# Patient Record
Sex: FEMALE | Race: WHITE | Employment: UNEMPLOYED | URBAN - METROPOLITAN AREA
[De-identification: names, ages, dates, MRNs, and addresses within clinical notes are randomized per-mention and may not be internally consistent; named-entity substitution may affect disease eponyms.]

---

## 2017-09-04 ENCOUNTER — HOSPITAL ENCOUNTER (EMERGENCY)
Age: 62
Discharge: HOME | End: 2017-09-04
Attending: EMERGENCY MEDICINE
Payer: MEDICAID

## 2017-09-04 VITALS
DIASTOLIC BLOOD PRESSURE: 76 MMHG | HEART RATE: 64 BPM | TEMPERATURE: 98 F | SYSTOLIC BLOOD PRESSURE: 136 MMHG | RESPIRATION RATE: 14 BRPM | OXYGEN SATURATION: 100 %

## 2017-09-04 DIAGNOSIS — R30.0 DYSURIA: Primary | ICD-10-CM

## 2017-09-04 LAB
APPEARANCE UR: CLEAR
BACTERIA URNS QL MICRO: ABNORMAL /HPF
BILIRUB UR QL: NEGATIVE
COLOR UR: YELLOW
EPITH CASTS URNS QL MICRO: ABNORMAL /LPF (ref 0–5)
GLUCOSE UR STRIP.AUTO-MCNC: NEGATIVE MG/DL
HGB UR QL STRIP: NEGATIVE
KETONES UR QL STRIP.AUTO: NEGATIVE MG/DL
LEUKOCYTE ESTERASE UR QL STRIP.AUTO: ABNORMAL
NITRITE UR QL STRIP.AUTO: NEGATIVE
PH UR STRIP: 7.5 [PH] (ref 5–8)
PROT UR STRIP-MCNC: NEGATIVE MG/DL
RBC #/AREA URNS HPF: ABNORMAL /HPF (ref 0–5)
SP GR UR REFRACTOMETRY: 1.02 (ref 1–1.03)
UROBILINOGEN UR QL STRIP.AUTO: 1 EU/DL (ref 0.2–1)
WBC URNS QL MICRO: ABNORMAL /HPF (ref 0–4)

## 2017-09-04 PROCEDURE — 87077 CULTURE AEROBIC IDENTIFY: CPT | Performed by: PHYSICIAN ASSISTANT

## 2017-09-04 PROCEDURE — 87186 SC STD MICRODIL/AGAR DIL: CPT | Performed by: PHYSICIAN ASSISTANT

## 2017-09-04 PROCEDURE — 99282 EMERGENCY DEPT VISIT SF MDM: CPT

## 2017-09-04 PROCEDURE — 87086 URINE CULTURE/COLONY COUNT: CPT | Performed by: PHYSICIAN ASSISTANT

## 2017-09-04 PROCEDURE — 81001 URINALYSIS AUTO W/SCOPE: CPT | Performed by: EMERGENCY MEDICINE

## 2017-09-04 RX ORDER — CIPROFLOXACIN 500 MG/1
500 TABLET ORAL 2 TIMES DAILY
Qty: 6 TAB | Refills: 0 | Status: SHIPPED | OUTPATIENT
Start: 2017-09-04 | End: 2017-09-07

## 2017-09-04 RX ORDER — FLUCONAZOLE 150 MG/1
150 TABLET ORAL
Qty: 1 TAB | Refills: 0 | Status: SHIPPED | OUTPATIENT
Start: 2017-09-04 | End: 2017-09-05

## 2017-09-04 NOTE — ED NOTES
I performed a brief evaluation, including history and physical, of the patient here in triage and I have determined that pt will need further treatment and evaluation from the fast track provider. I have placed initial orders to help in expediting patients care. September 04, 2017 at 1:43 PM - Janet Peraza DO        There were no vitals taken for this visit.        Dysuria, vag discharge

## 2017-09-04 NOTE — DISCHARGE INSTRUCTIONS
Painful Urination (Dysuria): Care Instructions  Your Care Instructions  Burning pain with urination (dysuria) is a common symptom of a urinary tract infection or other urinary problems. The bladder may become inflamed. This can cause pain when the bladder fills and empties. You may also feel pain if the tube that carries urine from the bladder to the outside of the body (urethra) gets irritated or infected. Sexually transmitted infections (STIs) also may cause pain when you urinate. Sometimes the pain can be caused by things other than an infection. The urethra can be irritated by soaps, perfumes, or foreign objects in the urethra. Kidney stones can cause pain when they pass through the urethra. The cause may be hard to find. You may need tests. Treatment for painful urination depends on the cause. Follow-up care is a key part of your treatment and safety. Be sure to make and go to all appointments, and call your doctor if you are having problems. It's also a good idea to know your test results and keep a list of the medicines you take. How can you care for yourself at home? · Drink extra water for the next day or two. This will help make the urine less concentrated. (If you have kidney, heart, or liver disease and have to limit fluids, talk with your doctor before you increase the amount of fluids you drink.)  · Avoid drinks that are carbonated or have caffeine. They can irritate the bladder. · Urinate often. Try to empty your bladder each time. For women:  · Urinate right after you have sex. · After going to the bathroom, wipe from front to back. · Avoid douches, bubble baths, and feminine hygiene sprays. And avoid other feminine hygiene products that have deodorants. When should you call for help? Call your doctor now or seek immediate medical care if:  · You have new symptoms, such as fever, nausea, or vomiting. · You have new or worse symptoms of a urinary problem.  For example:  ¨ You have blood or pus in your urine. ¨ You have chills or body aches. ¨ It hurts worse to urinate. ¨ You have groin or belly pain. ¨ You have pain in your back just below your rib cage (the flank area). Watch closely for changes in your health, and be sure to contact your doctor if you have any problems. Where can you learn more? Go to http://sudhakar-christen.info/. Enter D630 in the search box to learn more about \"Painful Urination (Dysuria): Care Instructions. \"  Current as of: 2016  Content Version: 11.3  © 4407-0855 [employer]. Care instructions adapted under license by [employer] (which disclaims liability or warranty for this information). If you have questions about a medical condition or this instruction, always ask your healthcare professional. [address] any warranty or liability for your use of this information. [employer] Activation    Thank you for requesting access to [employer]. Please follow the instructions below to securely access and download your online medical record. [employer] allows you to send messages to your doctor, view your test results, renew your prescriptions, schedule appointments, and more. How Do I Sign Up? 1. In your internet browser, go to [email or web address]  2. Click on the First Time User? Click Here link in the Sign In box. You will be redirect to the New Member Sign Up page. 3. Enter your [employer] Access Code exactly as it appears below. You will not need to use this code after youve completed the sign-up process. If you do not sign up before the expiration date, you must request a new code. [employer] Access Code: Activation code not generated  Current [employer] Status: Active (This is the date your [employer] access code will )    4. Enter the last four digits of your Social Security Number (xxxx) and Date of Birth (mm/dd/yyyy) as indicated and click Submit.  You will be taken to the next sign-up page.  5. Create a [employer] ID. This will be your [facility] login ID and cannot be changed, so think of one that is secure and easy to remember. 6. Create a [facility] password. You can change your password at any time. 7. Enter your Password Reset Question and Answer. This can be used at a later time if you forget your password. 8. Enter your e-mail address. You will receive e-mail notification when new information is available in [address]. 9. Click Sign Up. You can now view and download portions of your medical record. 10. Click the Download Summary menu link to download a portable copy of your medical information. Additional Information    If you have questions, please visit the Frequently Asked Questions section of the [facility] website at [email or web address] Remember, [facility] is NOT to be used for urgent needs. For medical emergencies, dial 911.
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
## 2017-09-04 NOTE — ED TRIAGE NOTES
\" I think I have a UTI\", pt reports sharp pain upon urination. Possible vaginal discharge that smells like bleach. Pt reports ear infection as well.

## 2017-09-06 LAB
BACTERIA SPEC CULT: ABNORMAL
SERVICE CMNT-IMP: ABNORMAL

## 2017-09-08 RX ORDER — CEPHALEXIN 500 MG/1
500 CAPSULE ORAL 2 TIMES DAILY
Qty: 14 CAP | Refills: 0 | Status: SHIPPED | OUTPATIENT
Start: 2017-09-08 | End: 2017-09-15

## 2017-11-07 ENCOUNTER — HOSPITAL ENCOUNTER (OUTPATIENT)
Dept: GENERAL RADIOLOGY | Age: 62
Discharge: HOME | End: 2017-11-07
Payer: MEDICAID

## 2017-11-07 DIAGNOSIS — M54.2 CERVICALGIA: ICD-10-CM

## 2017-11-07 PROCEDURE — 72114 X-RAY EXAM L-S SPINE BENDING: CPT

## 2017-11-07 PROCEDURE — 72052 X-RAY EXAM NECK SPINE 6/>VWS: CPT

## 2017-12-11 PROBLEM — E66.01 OBESITY, MORBID (HCC): Status: ACTIVE | Noted: 2017-12-11

## 2018-01-22 ENCOUNTER — HOSPITAL ENCOUNTER (EMERGENCY)
Age: 63
Discharge: HOME | End: 2018-01-22
Attending: EMERGENCY MEDICINE
Payer: MEDICAID

## 2018-01-22 VITALS
DIASTOLIC BLOOD PRESSURE: 104 MMHG | OXYGEN SATURATION: 98 % | RESPIRATION RATE: 14 BRPM | SYSTOLIC BLOOD PRESSURE: 156 MMHG | WEIGHT: 293 LBS | HEART RATE: 79 BPM | HEIGHT: 67 IN | BODY MASS INDEX: 45.99 KG/M2 | TEMPERATURE: 97.8 F

## 2018-01-22 DIAGNOSIS — R03.0 ELEVATED BLOOD PRESSURE READING: ICD-10-CM

## 2018-01-22 DIAGNOSIS — N90.89 SKIN TAG OF LABIA: Primary | ICD-10-CM

## 2018-01-22 LAB
ALBUMIN SERPL-MCNC: 3.6 G/DL (ref 3.4–5)
ALBUMIN/GLOB SERPL: 0.9 {RATIO} (ref 0.8–1.7)
ALP SERPL-CCNC: 73 U/L (ref 45–117)
ALT SERPL-CCNC: 18 U/L (ref 13–56)
ANION GAP SERPL CALC-SCNC: 4 MMOL/L (ref 3–18)
APPEARANCE UR: CLEAR
AST SERPL-CCNC: 17 U/L (ref 15–37)
BASOPHILS # BLD: 0 K/UL (ref 0–0.06)
BASOPHILS NFR BLD: 0 % (ref 0–2)
BILIRUB DIRECT SERPL-MCNC: 0.3 MG/DL (ref 0–0.2)
BILIRUB SERPL-MCNC: 1 MG/DL (ref 0.2–1)
BILIRUB UR QL: NEGATIVE
BUN SERPL-MCNC: 12 MG/DL (ref 7–18)
BUN/CREAT SERPL: 18 (ref 12–20)
CALCIUM SERPL-MCNC: 8.5 MG/DL (ref 8.5–10.1)
CHLORIDE SERPL-SCNC: 102 MMOL/L (ref 100–108)
CO2 SERPL-SCNC: 32 MMOL/L (ref 21–32)
COLOR UR: YELLOW
CREAT SERPL-MCNC: 0.67 MG/DL (ref 0.6–1.3)
DIFFERENTIAL METHOD BLD: NORMAL
EOSINOPHIL # BLD: 0.2 K/UL (ref 0–0.4)
EOSINOPHIL NFR BLD: 2 % (ref 0–5)
ERYTHROCYTE [DISTWIDTH] IN BLOOD BY AUTOMATED COUNT: 13.5 % (ref 11.6–14.5)
GLOBULIN SER CALC-MCNC: 4.2 G/DL (ref 2–4)
GLUCOSE SERPL-MCNC: 99 MG/DL (ref 74–99)
GLUCOSE UR STRIP.AUTO-MCNC: NEGATIVE MG/DL
HCT VFR BLD AUTO: 41.6 % (ref 35–45)
HGB BLD-MCNC: 14 G/DL (ref 12–16)
HGB UR QL STRIP: NEGATIVE
KETONES UR QL STRIP.AUTO: NEGATIVE MG/DL
LEUKOCYTE ESTERASE UR QL STRIP.AUTO: NEGATIVE
LIPASE SERPL-CCNC: 80 U/L (ref 73–393)
LYMPHOCYTES # BLD: 2.8 K/UL (ref 0.9–3.6)
LYMPHOCYTES NFR BLD: 36 % (ref 21–52)
MCH RBC QN AUTO: 29.5 PG (ref 24–34)
MCHC RBC AUTO-ENTMCNC: 33.7 G/DL (ref 31–37)
MCV RBC AUTO: 87.8 FL (ref 74–97)
MONOCYTES # BLD: 0.5 K/UL (ref 0.05–1.2)
MONOCYTES NFR BLD: 6 % (ref 3–10)
NEUTS SEG # BLD: 4.3 K/UL (ref 1.8–8)
NEUTS SEG NFR BLD: 56 % (ref 40–73)
NITRITE UR QL STRIP.AUTO: NEGATIVE
PH UR STRIP: 5.5 [PH] (ref 5–8)
PLATELET # BLD AUTO: 210 K/UL (ref 135–420)
PMV BLD AUTO: 10.1 FL (ref 9.2–11.8)
POTASSIUM SERPL-SCNC: 4.1 MMOL/L (ref 3.5–5.5)
PROT SERPL-MCNC: 7.8 G/DL (ref 6.4–8.2)
PROT UR STRIP-MCNC: NEGATIVE MG/DL
RBC # BLD AUTO: 4.74 M/UL (ref 4.2–5.3)
SERVICE CMNT-IMP: NORMAL
SODIUM SERPL-SCNC: 138 MMOL/L (ref 136–145)
SP GR UR REFRACTOMETRY: 1.01 (ref 1–1.03)
UROBILINOGEN UR QL STRIP.AUTO: 0.2 EU/DL (ref 0.2–1)
WBC # BLD AUTO: 7.7 K/UL (ref 4.6–13.2)
WET PREP GENITAL: NORMAL

## 2018-01-22 PROCEDURE — 80048 BASIC METABOLIC PNL TOTAL CA: CPT | Performed by: NURSE PRACTITIONER

## 2018-01-22 PROCEDURE — 99284 EMERGENCY DEPT VISIT MOD MDM: CPT

## 2018-01-22 PROCEDURE — 83690 ASSAY OF LIPASE: CPT | Performed by: NURSE PRACTITIONER

## 2018-01-22 PROCEDURE — 87491 CHLMYD TRACH DNA AMP PROBE: CPT | Performed by: NURSE PRACTITIONER

## 2018-01-22 PROCEDURE — 87086 URINE CULTURE/COLONY COUNT: CPT | Performed by: NURSE PRACTITIONER

## 2018-01-22 PROCEDURE — 74011250637 HC RX REV CODE- 250/637: Performed by: NURSE PRACTITIONER

## 2018-01-22 PROCEDURE — 80076 HEPATIC FUNCTION PANEL: CPT | Performed by: NURSE PRACTITIONER

## 2018-01-22 PROCEDURE — 87040 BLOOD CULTURE FOR BACTERIA: CPT | Performed by: NURSE PRACTITIONER

## 2018-01-22 PROCEDURE — 85025 COMPLETE CBC W/AUTO DIFF WBC: CPT | Performed by: NURSE PRACTITIONER

## 2018-01-22 PROCEDURE — 87210 SMEAR WET MOUNT SALINE/INK: CPT | Performed by: NURSE PRACTITIONER

## 2018-01-22 PROCEDURE — 81003 URINALYSIS AUTO W/O SCOPE: CPT | Performed by: NURSE PRACTITIONER

## 2018-01-22 RX ORDER — HYDROCODONE BITARTRATE AND ACETAMINOPHEN 5; 325 MG/1; MG/1
1 TABLET ORAL
Status: COMPLETED | OUTPATIENT
Start: 2018-01-22 | End: 2018-01-22

## 2018-01-22 RX ADMIN — HYDROCODONE BITARTRATE AND ACETAMINOPHEN 1 TABLET: 5; 325 TABLET ORAL at 18:04
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
## 2018-01-22 NOTE — ED NOTES
Patient states she has a \"flap of skin filled with pus\" on her clitoris and that she has been taking diflucan every day for the last month. Patient states \"this all started years ago when that nurse gave me MRSA from a dirty needle off the floor\". Patient states her urologist has seen her for this already.

## 2018-01-23 LAB
C TRACH RRNA SPEC QL NAA+PROBE: NEGATIVE
N GONORRHOEA RRNA SPEC QL NAA+PROBE: NEGATIVE
SPECIMEN SOURCE: NORMAL

## 2018-01-23 NOTE — ED NOTES
I have reviewed discharge instructions with patient. Patient verbalized understanding and has no further questions at this time. Education taught and patient verbalized understanding of education. Teach back method used. IV removed, catheter tip intact on removal.    Patients pain decreased. Belongings given to patient. Patient discharged with family to home. Patient states she will not be driving today.

## 2018-01-23 NOTE — DISCHARGE INSTRUCTIONS
Elevated Blood Pressure: Care Instructions  Your Care Instructions    Blood pressure is a measure of how hard the blood pushes against the walls of your arteries. It's normal for blood pressure to go up and down throughout the day. But if it stays up over time, you have high blood pressure. Two numbers tell you your blood pressure. The first number is the systolic pressure. It shows how hard the blood pushes when your heart is pumping. The second number is the diastolic pressure. It shows how hard the blood pushes between heartbeats, when your heart is relaxed and filling with blood. An ideal blood pressure in adults is less than 120/80 (say \"120 over 80\"). High blood pressure is 140/90 or higher. You have high blood pressure if your top number is 140 or higher or your bottom number is 90 or higher, or both. The main test for high blood pressure is simple, fast, and painless. To diagnose high blood pressure, your doctor will test your blood pressure at different times. After testing your blood pressure, your doctor may ask you to test it again when you are home. If you are diagnosed with high blood pressure, you can work with your doctor to make a long-term plan to manage it. Follow-up care is a key part of your treatment and safety. Be sure to make and go to all appointments, and call your doctor if you are having problems. It's also a good idea to know your test results and keep a list of the medicines you take. How can you care for yourself at home? · Do not smoke. Smoking increases your risk for heart attack and stroke. If you need help quitting, talk to your doctor about stop-smoking programs and medicines. These can increase your chances of quitting for good. · Stay at a healthy weight. · Try to limit how much sodium you eat to less than 2,300 milligrams (mg) a day. Your doctor may ask you to try to eat less than 1,500 mg a day. · Be physically active.  Get at least 30 minutes of exercise on most days of the week. Walking is a good choice. You also may want to do other activities, such as running, swimming, cycling, or playing tennis or team sports. · Avoid or limit alcohol. Talk to your doctor about whether you can drink any alcohol. · Eat plenty of fruits, vegetables, and low-fat dairy products. Eat less saturated and total fats. · Learn how to check your blood pressure at home. When should you call for help? Call your doctor now or seek immediate medical care if:  ? · Your blood pressure is much higher than normal (such as 180/110 or higher). ? · You think high blood pressure is causing symptoms such as:  ¨ Severe headache. ¨ Blurry vision. ? Watch closely for changes in your health, and be sure to contact your doctor if:  ? · You do not get better as expected. Where can you learn more? Go to http://sudhakarM. STEVES USAchristen.info/. Enter W827 in the search box to learn more about \"Elevated Blood Pressure: Care Instructions. \"  Current as of: September 21, 2016  Content Version: 11.4  © 9077-4532 [employer]. Care instructions adapted under license by [employer] (which disclaims liability or warranty for this information). If you have questions about a medical condition or this instruction, always ask your healthcare professional. [address] any warranty or liability for your use of this information. [employer] Activation    Thank you for requesting access to [employer]. Please follow the instructions below to securely access and download your online medical record. [employer] allows you to send messages to your doctor, view your test results, renew your prescriptions, schedule appointments, and more. How Do I Sign Up? 1. In your internet browser, go to [email or web address]  2. Click on the First Time User? Click Here link in the Sign In box. You will be redirect to the New Member Sign Up page.   3. Enter your [employer] Access Code exactly as it appears below. You will not need to use this code after youve completed the sign-up process. If you do not sign up before the expiration date, you must request a new code. Raise Access Code: Activation code not generated  Current Raise Status: Active (This is the date your Raise access code will )    4. Enter the last four digits of your Social Security Number (xxxx) and Date of Birth (mm/dd/yyyy) as indicated and click Submit. You will be taken to the next sign-up page. 5. Create a GridGain Systemst ID. This will be your Raise login ID and cannot be changed, so think of one that is secure and easy to remember. 6. Create a Raise password. You can change your password at any time. 7. Enter your Password Reset Question and Answer. This can be used at a later time if you forget your password. 8. Enter your e-mail address. You will receive e-mail notification when new information is available in [address]. 9. Click Sign Up. You can now view and download portions of your medical record. 10. Click the Download Summary menu link to download a portable copy of your medical information. Additional Information    If you have questions, please visit the Frequently Asked Questions section of the Raise website at [email or web address] Remember, Raise is NOT to be used for urgent needs. For medical emergencies, dial 911. Follow up with your primary care provider for further evaluation and treatment.

## 2018-01-24 LAB
BACTERIA SPEC CULT: NORMAL
SERVICE CMNT-IMP: NORMAL

## 2018-01-28 LAB
BACTERIA SPEC CULT: NORMAL
SERVICE CMNT-IMP: NORMAL

## 2018-03-05 ENCOUNTER — HOSPITAL ENCOUNTER (EMERGENCY)
Age: 63
Discharge: HOME | End: 2018-03-05
Attending: EMERGENCY MEDICINE
Payer: MEDICAID

## 2018-03-05 VITALS
TEMPERATURE: 98.1 F | BODY MASS INDEX: 45.99 KG/M2 | OXYGEN SATURATION: 95 % | WEIGHT: 293 LBS | HEIGHT: 67 IN | SYSTOLIC BLOOD PRESSURE: 160 MMHG | HEART RATE: 79 BPM | RESPIRATION RATE: 16 BRPM | DIASTOLIC BLOOD PRESSURE: 93 MMHG

## 2018-03-05 DIAGNOSIS — T78.40XA ALLERGIC REACTION TO DRUG, INITIAL ENCOUNTER: Primary | ICD-10-CM

## 2018-03-05 PROCEDURE — 99281 EMR DPT VST MAYX REQ PHY/QHP: CPT
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
## 2018-03-05 NOTE — DISCHARGE INSTRUCTIONS
Allergic Reaction: Care Instructions  Your Care Instructions    An allergic reaction is an excessive response from your immune system to a medicine, chemical, food, insect bite, or other substance. A reaction can range from mild to life-threatening. Some people have a mild rash, hives, and itching or stomach cramps. In severe reactions, swelling of your tongue and throat can close up your airway so that you cannot breathe. Follow-up care is a key part of your treatment and safety. Be sure to make and go to all appointments, and call your doctor if you are having problems. It's also a good idea to know your test results and keep a list of the medicines you take. How can you care for yourself at home? · If you know what caused your allergic reaction, be sure to avoid it. Your allergy may become more severe each time you have a reaction. · Take an over-the-counter antihistamine, such as cetirizine (Zyrtec) or loratadine (Claritin), to treat mild symptoms. Read and follow directions on the label. Some antihistamines can make you feel sleepy. Do not give antihistamines to a child unless you have checked with your doctor first. Mild symptoms include sneezing or an itchy or runny nose; an itchy mouth; a few hives or mild itching; and mild nausea or stomach discomfort. · Do not scratch hives or a rash. Put a cold, moist towel on them or take cool baths to relieve itching. Put ice packs on hives, swelling, or insect stings for 10 to 15 minutes at a time. Put a thin cloth between the ice pack and your skin. Do not take hot baths or showers. They will make the itching worse. · Your doctor may prescribe a shot of epinephrine to carry with you in case you have a severe reaction. Learn how to give yourself the shot and keep it with you at all times. Make sure it is not . · Go to the emergency room every time you have a severe reaction, even if you have used your shot of epinephrine and are feeling better. Symptoms can come back after a shot. · Wear medical alert jewelry that lists your allergies. You can buy this at most drugstores. · If your child has a severe allergy, make sure that his or her teachers, babysitters, coaches, and other caregivers know about the allergy. They should have an epinephrine shot, know how and when to give it, and have a plan to take your child to the hospital.  When should you call for help? Give an epinephrine shot if:  ? · You think you are having a severe allergic reaction. ? · You have symptoms in more than one body area, such as mild nausea and an itchy mouth. ? After giving an epinephrine shot call 911, even if you feel better. ?Call 911 if:  ? · You have symptoms of a severe allergic reaction. These may include:  ¨ Sudden raised, red areas (hives) all over your body. ¨ Swelling of the throat, mouth, lips, or tongue. ¨ Trouble breathing. ¨ Passing out (losing consciousness). Or you may feel very lightheaded or suddenly feel weak, confused, or restless. ? · You have been given an epinephrine shot, even if you feel better. ?Call your doctor now or seek immediate medical care if:  ? · You have symptoms of an allergic reaction, such as:  ¨ A rash or hives (raised, red areas on the skin). ¨ Itching. ¨ Swelling. ¨ Belly pain, nausea, or vomiting. ? Watch closely for changes in your health, and be sure to contact your doctor if:  ? · You do not get better as expected. Where can you learn more? Go to http://sudhakar-christen.info/. Enter J917 in the search box to learn more about \"Allergic Reaction: Care Instructions. \"  Current as of: September 29, 2016  Content Version: 11.4  © 3751-6104 [employer]. Care instructions adapted under license by [employer] (which disclaims liability or warranty for this information).  If you have questions about a medical condition or this instruction, always ask your healthcare professional. [employer], [facility] disclaims any warranty or liability for your use of this information. [email or web address] Activation    Thank you for requesting access to [employer]. Please follow the instructions below to securely access and download your online medical record. [employer] allows you to send messages to your doctor, view your test results, renew your prescriptions, schedule appointments, and more. How Do I Sign Up? 1. In your internet browser, go to [email or web address]  2. Click on the First Time User? Click Here link in the Sign In box. You will be redirect to the New Member Sign Up page. 3. Enter your [employer] Access Code exactly as it appears below. You will not need to use this code after youve completed the sign-up process. If you do not sign up before the expiration date, you must request a new code. [employer] Access Code: Activation code not generated  Current [employer] Status: Active (This is the date your [employer] access code will )    4. Enter the last four digits of your Social Security Number (xxxx) and Date of Birth (mm/dd/yyyy) as indicated and click Submit. You will be taken to the next sign-up page. 5. Create a [employer] ID. This will be your [employer] login ID and cannot be changed, so think of one that is secure and easy to remember. 6. Create a [employer] password. You can change your password at any time. 7. Enter your Password Reset Question and Answer. This can be used at a later time if you forget your password. 8. Enter your e-mail address. You will receive e-mail notification when new information is available in [address] 9. Click Sign Up. You can now view and download portions of your medical record. 10. Click the Download Summary menu link to download a portable copy of your medical information. Additional Information    If you have questions, please visit the Frequently Asked Questions section of the [employer] website at [email or web address] Remember, [employer] is NOT to be used for urgent needs. For medical emergencies, dial 911.

## 2018-06-19 ENCOUNTER — HOSPITAL ENCOUNTER (EMERGENCY)
Age: 63
Discharge: HOME | End: 2018-06-19
Attending: EMERGENCY MEDICINE
Payer: MEDICAID

## 2018-06-19 ENCOUNTER — APPOINTMENT (OUTPATIENT)
Dept: GENERAL RADIOLOGY | Age: 63
End: 2018-06-19
Attending: EMERGENCY MEDICINE
Payer: MEDICAID

## 2018-06-19 VITALS
BODY MASS INDEX: 45.99 KG/M2 | TEMPERATURE: 97 F | SYSTOLIC BLOOD PRESSURE: 137 MMHG | WEIGHT: 293 LBS | HEART RATE: 59 BPM | OXYGEN SATURATION: 100 % | DIASTOLIC BLOOD PRESSURE: 74 MMHG | HEIGHT: 67 IN | RESPIRATION RATE: 15 BRPM

## 2018-06-19 DIAGNOSIS — R10.9 RIGHT FLANK PAIN: ICD-10-CM

## 2018-06-19 DIAGNOSIS — T78.40XA ALLERGIC REACTION, INITIAL ENCOUNTER: ICD-10-CM

## 2018-06-19 DIAGNOSIS — R21 RASH AND OTHER NONSPECIFIC SKIN ERUPTION: ICD-10-CM

## 2018-06-19 DIAGNOSIS — R07.89 ATYPICAL CHEST PAIN: Primary | ICD-10-CM

## 2018-06-19 LAB
ALBUMIN SERPL-MCNC: 3.5 G/DL (ref 3.4–5)
ALBUMIN/GLOB SERPL: 0.9 {RATIO} (ref 0.8–1.7)
ALP SERPL-CCNC: 63 U/L (ref 45–117)
ALT SERPL-CCNC: 13 U/L (ref 13–56)
ANION GAP SERPL CALC-SCNC: 4 MMOL/L (ref 3–18)
AST SERPL-CCNC: 15 U/L (ref 15–37)
ATRIAL RATE: 67 BPM
BASOPHILS # BLD: 0 K/UL (ref 0–0.06)
BASOPHILS NFR BLD: 0 % (ref 0–2)
BILIRUB SERPL-MCNC: 0.9 MG/DL (ref 0.2–1)
BUN SERPL-MCNC: 14 MG/DL (ref 7–18)
BUN/CREAT SERPL: 20 (ref 12–20)
CALCIUM SERPL-MCNC: 8.3 MG/DL (ref 8.5–10.1)
CALCULATED P AXIS, ECG09: 38 DEGREES
CALCULATED R AXIS, ECG10: -12 DEGREES
CALCULATED T AXIS, ECG11: 10 DEGREES
CHLORIDE SERPL-SCNC: 105 MMOL/L (ref 100–108)
CK MB CFR SERPL CALC: NORMAL % (ref 0–4)
CK MB SERPL-MCNC: <1 NG/ML (ref 5–25)
CK SERPL-CCNC: 62 U/L (ref 26–192)
CO2 SERPL-SCNC: 31 MMOL/L (ref 21–32)
CREAT SERPL-MCNC: 0.7 MG/DL (ref 0.6–1.3)
DIAGNOSIS, 93000: NORMAL
DIFFERENTIAL METHOD BLD: NORMAL
EOSINOPHIL # BLD: 0.1 K/UL (ref 0–0.4)
EOSINOPHIL NFR BLD: 1 % (ref 0–5)
ERYTHROCYTE [DISTWIDTH] IN BLOOD BY AUTOMATED COUNT: 13.3 % (ref 11.6–14.5)
GLOBULIN SER CALC-MCNC: 3.9 G/DL (ref 2–4)
GLUCOSE BLD STRIP.AUTO-MCNC: 86 MG/DL (ref 70–110)
GLUCOSE SERPL-MCNC: 100 MG/DL (ref 74–99)
HCT VFR BLD AUTO: 41.1 % (ref 35–45)
HGB BLD-MCNC: 13.6 G/DL (ref 12–16)
LIPASE SERPL-CCNC: 71 U/L (ref 73–393)
LYMPHOCYTES # BLD: 1.9 K/UL (ref 0.9–3.6)
LYMPHOCYTES NFR BLD: 34 % (ref 21–52)
MAGNESIUM SERPL-MCNC: 2.1 MG/DL (ref 1.6–2.6)
MCH RBC QN AUTO: 28.8 PG (ref 24–34)
MCHC RBC AUTO-ENTMCNC: 33.1 G/DL (ref 31–37)
MCV RBC AUTO: 86.9 FL (ref 74–97)
MONOCYTES # BLD: 0.4 K/UL (ref 0.05–1.2)
MONOCYTES NFR BLD: 6 % (ref 3–10)
NEUTS SEG # BLD: 3.2 K/UL (ref 1.8–8)
NEUTS SEG NFR BLD: 59 % (ref 40–73)
P-R INTERVAL, ECG05: 234 MS
PLATELET # BLD AUTO: 215 K/UL (ref 135–420)
PMV BLD AUTO: 10 FL (ref 9.2–11.8)
POTASSIUM SERPL-SCNC: 4.2 MMOL/L (ref 3.5–5.5)
PROT SERPL-MCNC: 7.4 G/DL (ref 6.4–8.2)
Q-T INTERVAL, ECG07: 418 MS
QRS DURATION, ECG06: 98 MS
QTC CALCULATION (BEZET), ECG08: 441 MS
RBC # BLD AUTO: 4.73 M/UL (ref 4.2–5.3)
SODIUM SERPL-SCNC: 140 MMOL/L (ref 136–145)
TROPONIN I SERPL-MCNC: <0.02 NG/ML (ref 0–0.04)
TROPONIN I SERPL-MCNC: <0.02 NG/ML (ref 0–0.04)
VENTRICULAR RATE, ECG03: 67 BPM
WBC # BLD AUTO: 5.5 K/UL (ref 4.6–13.2)

## 2018-06-19 PROCEDURE — 71045 X-RAY EXAM CHEST 1 VIEW: CPT

## 2018-06-19 PROCEDURE — 82962 GLUCOSE BLOOD TEST: CPT

## 2018-06-19 PROCEDURE — 74011000250 HC RX REV CODE- 250: Performed by: EMERGENCY MEDICINE

## 2018-06-19 PROCEDURE — 74011250636 HC RX REV CODE- 250/636: Performed by: EMERGENCY MEDICINE

## 2018-06-19 PROCEDURE — 85025 COMPLETE CBC W/AUTO DIFF WBC: CPT

## 2018-06-19 PROCEDURE — 96361 HYDRATE IV INFUSION ADD-ON: CPT

## 2018-06-19 PROCEDURE — 93005 ELECTROCARDIOGRAM TRACING: CPT

## 2018-06-19 PROCEDURE — 80053 COMPREHEN METABOLIC PANEL: CPT

## 2018-06-19 PROCEDURE — 83690 ASSAY OF LIPASE: CPT

## 2018-06-19 PROCEDURE — 83735 ASSAY OF MAGNESIUM: CPT

## 2018-06-19 PROCEDURE — 96374 THER/PROPH/DIAG INJ IV PUSH: CPT

## 2018-06-19 PROCEDURE — 82553 CREATINE MB FRACTION: CPT

## 2018-06-19 PROCEDURE — 99285 EMERGENCY DEPT VISIT HI MDM: CPT

## 2018-06-19 RX ORDER — INSULIN HUMAN 100 [IU]/ML
10 INJECTION, SUSPENSION SUBCUTANEOUS
Qty: 5 ADJUSTABLE DOSE PRE-FILLED PEN SYRINGE | Refills: 0 | Status: SHIPPED | OUTPATIENT
Start: 2018-06-19

## 2018-06-19 RX ORDER — FAMOTIDINE 10 MG/ML
20 INJECTION INTRAVENOUS
Status: COMPLETED | OUTPATIENT
Start: 2018-06-19 | End: 2018-06-19

## 2018-06-19 RX ADMIN — SODIUM CHLORIDE 1000 ML: 900 INJECTION, SOLUTION INTRAVENOUS at 11:58

## 2018-06-19 RX ADMIN — FAMOTIDINE 20 MG: 10 INJECTION, SOLUTION INTRAVENOUS at 11:58

## 2018-06-19 NOTE — DISCHARGE INSTRUCTIONS
Chest Pain: Care Instructions  Your Care Instructions    There are many things that can cause chest pain. Some are not serious and will get better on their own in a few days. But some kinds of chest pain need more testing and treatment. Your doctor may have recommended a follow-up visit in the next 8 to 12 hours. If you are not getting better, you may need more tests or treatment. Even though your doctor has released you, you still need to watch for any problems. The doctor carefully checked you, but sometimes problems can develop later. If you have new symptoms or if your symptoms do not get better, get medical care right away. If you have worse or different chest pain or pressure that lasts more than 5 minutes or you passed out (lost consciousness), call 911 or seek other emergency help right away. A medical visit is only one step in your treatment. Even if you feel better, you still need to do what your doctor recommends, such as going to all suggested follow-up appointments and taking medicines exactly as directed. This will help you recover and help prevent future problems. How can you care for yourself at home? · Rest until you feel better. · Take your medicine exactly as prescribed. Call your doctor if you think you are having a problem with your medicine. · Do not drive after taking a prescription pain medicine. When should you call for help? Call 911 if:  ? · You passed out (lost consciousness). ? · You have severe difficulty breathing. ? · You have symptoms of a heart attack. These may include:  ¨ Chest pain or pressure, or a strange feeling in your chest.  ¨ Sweating. ¨ Shortness of breath. ¨ Nausea or vomiting. ¨ Pain, pressure, or a strange feeling in your back, neck, jaw, or upper belly or in one or both shoulders or arms. ¨ Lightheadedness or sudden weakness. ¨ A fast or irregular heartbeat.   After you call 911, the  may tell you to chew 1 adult-strength or 2 to 4 low-dose aspirin. Wait for an ambulance. Do not try to drive yourself. ?Call your doctor today if:  ? · You have any trouble breathing. ? · Your chest pain gets worse. ? · You are dizzy or lightheaded, or you feel like you may faint. ? · You are not getting better as expected. ? · You are having new or different chest pain. Where can you learn more? Go to http://sudhakar-christen.info/. Enter A120 in the search box to learn more about \"Chest Pain: Care Instructions. \"  Current as of: March 20, 2017  Content Version: 11.4  © 0173-2326 [employer]. Care instructions adapted under license by [employer] (which disclaims liability or warranty for this information). If you have questions about a medical condition or this instruction, always ask your healthcare professional. [address] any warranty or liability for your use of this information.
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
## 2018-09-14 ENCOUNTER — HOSPITAL ENCOUNTER (EMERGENCY)
Age: 63
Discharge: HOME | End: 2018-09-14
Attending: EMERGENCY MEDICINE | Admitting: EMERGENCY MEDICINE
Payer: MEDICAID

## 2018-09-14 VITALS
HEART RATE: 94 BPM | BODY MASS INDEX: 45.99 KG/M2 | WEIGHT: 293 LBS | SYSTOLIC BLOOD PRESSURE: 166 MMHG | TEMPERATURE: 98.8 F | DIASTOLIC BLOOD PRESSURE: 103 MMHG | RESPIRATION RATE: 16 BRPM | HEIGHT: 67 IN | OXYGEN SATURATION: 97 %

## 2018-09-14 DIAGNOSIS — N39.0 URINARY TRACT INFECTION WITHOUT HEMATURIA, SITE UNSPECIFIED: Primary | ICD-10-CM

## 2018-09-14 LAB
APPEARANCE UR: CLEAR
BACTERIA URNS QL MICRO: ABNORMAL /HPF
BILIRUB UR QL: NEGATIVE
COLOR UR: YELLOW
EPITH CASTS URNS QL MICRO: ABNORMAL /LPF (ref 0–5)
GLUCOSE UR STRIP.AUTO-MCNC: NEGATIVE MG/DL
HGB UR QL STRIP: ABNORMAL
KETONES UR QL STRIP.AUTO: NEGATIVE MG/DL
LEUKOCYTE ESTERASE UR QL STRIP.AUTO: ABNORMAL
NITRITE UR QL STRIP.AUTO: NEGATIVE
PH UR STRIP: 5 [PH] (ref 5–8)
PROT UR STRIP-MCNC: NEGATIVE MG/DL
RBC #/AREA URNS HPF: ABNORMAL /HPF (ref 0–5)
SP GR UR REFRACTOMETRY: 1.02 (ref 1–1.03)
UROBILINOGEN UR QL STRIP.AUTO: 0.2 EU/DL (ref 0.2–1)
WBC URNS QL MICRO: ABNORMAL /HPF (ref 0–4)

## 2018-09-14 PROCEDURE — 99283 EMERGENCY DEPT VISIT LOW MDM: CPT

## 2018-09-14 PROCEDURE — 74011250637 HC RX REV CODE- 250/637: Performed by: EMERGENCY MEDICINE

## 2018-09-14 PROCEDURE — 81001 URINALYSIS AUTO W/SCOPE: CPT | Performed by: EMERGENCY MEDICINE

## 2018-09-14 PROCEDURE — 87081 CULTURE SCREEN ONLY: CPT | Performed by: EMERGENCY MEDICINE

## 2018-09-14 RX ORDER — NYSTATIN 100000 U/G
CREAM TOPICAL 2 TIMES DAILY
Qty: 15 G | Refills: 0 | Status: SHIPPED | OUTPATIENT
Start: 2018-09-14 | End: 2018-10-15

## 2018-09-14 RX ORDER — CEPHALEXIN 250 MG/1
500 CAPSULE ORAL
Status: COMPLETED | OUTPATIENT
Start: 2018-09-14 | End: 2018-09-14

## 2018-09-14 RX ORDER — CEPHALEXIN 500 MG/1
500 CAPSULE ORAL 4 TIMES DAILY
Qty: 28 CAP | Refills: 0 | Status: SHIPPED | OUTPATIENT
Start: 2018-09-14 | End: 2018-09-21

## 2018-09-14 RX ADMIN — CEPHALEXIN 500 MG: 250 CAPSULE ORAL at 13:15

## 2018-09-14 NOTE — ED NOTES
I have reviewed discharge instructions with the patient. The patient verbalized understanding. Patient armband removed and given to patient to take home. Patient was informed of the privacy risks if armband lost or stolen. Pt alert, oriented x4 and ambulatory out of ER in [facility] at this time. VSS.

## 2018-09-14 NOTE — ED PROVIDER NOTES
EMERGENCY DEPARTMENT HISTORY AND PHYSICAL EXAM 
 
12:12 PM 
 
 
Date: 9/14/2018 Patient Name: Dora Tobar History of Presenting Illness Chief Complaint Patient presents with  Toe Pain  Urinary Frequency  Sore Throat  Ear Pain HPI: Dora Tobar is a 61 y.o. female with acute, moderate bilateral ear pain onset x \"this morning\" with an associated sore throat. Pt states this morning her ears began to hurt and then started to swell near the ear externally. She reports the right ear had blood coming out of it as well. Pt also complains of dysuria described as burning x 2 days and currently can't see her urologist. She states she has a UTI about every 6 months. Pt complains of toe pain of her 3rd toe hurting due to an ingrown toenail and believes it is infected. No further concerns or complaints at this time. PCP: Santana Brunner MD 
 
Current Outpatient Prescriptions Medication Sig Dispense Refill  HUMULIN N NPH INSULIN KWIKPEN 100 unit/mL (3 mL) inpn 10 Units by SubCUTAneous route three (3) times daily (after meals). 5 Adjustable Dose Pre-filled Pen Syringe 0  
 naloxone (NARCAN) 1 mg/mL injection  nystatin (MYCOSTATIN) topical cream Apply  to affected area two (2) times a day. Until healed 15 g 6  ALPRAZolam (XANAX) 2 mg tablet  amLODIPine (NORVASC) 5 mg tablet  diphenhydrAMINE (BENADRYL) 25 mg capsule Take 1 Cap by Mouth Every 4 Hours As Needed.  clindamycin (CLINDAGEL) 1 % topical gel  FLUZONE QUAD 6557-5244, PF, syrg injection  NARCAN 4 mg/actuation nasal spray     
 nitrofurantoin, macrocrystal-monohydrate, (MACROBID) 100 mg capsule  sucralfate (CARAFATE) 1 gram tablet 1 g.    
 HYDROcodone-acetaminophen (XODOL) 7.5-300 mg tablet Take  by mouth.     
 TRUE METRIX GLUCOSE TEST STRIP strip TEST GLUCOSE four times a day  0  
 TRUE METRIX GLUCOSE METER misc TEST GGLUCOSE four times a day  0  
  clindamycin (CLEOCIN T) 1 % lotion  FLUoxetine (PROZAC) 20 mg tablet  WOOD PEN NEEDLE 32 gauge x 5/32\" ndle   0  
 PHENYLEPHRINE/HYDROCODONE/CP (HYDROCODONE HD PO) Take  by mouth.  ergocalciferol (VITAMIN D2) 50,000 unit capsule Take 50,000 Units by mouth.  ciclopirox (PENLAC) 8 % solution Apply  to affected area nightly.  furosemide (LASIX) 20 mg tablet Take 20 mg by mouth as needed.  insulin nph-regular human rec (NOVOLIN 70/30 INNOLET) 100 unit/mL (70-30) flexpen 10 Units by SubCUTAneous route.  aliskiren (TEKTURNA) 150 mg tablet Take  by mouth daily.  albuterol (PROAIR HFA) 90 mcg/Actuation inhaler Take 1 Puff by inhalation. Past History Past Medical History: 
Past Medical History:  
Diagnosis Date  Abuse  Acute lower UTI (urinary tract infection)  Anemia NEC  Anxiety disorder  Arrhythmia  Arthritis  Arthropathy  Asthma  Benign tumor of throat  Breast cancer ([address] right breast  
 Broken ankle 1960 Right- loss argenis bone  Cancer ([address]  Cardiac arrhythmia  Chronic pain  Colon cancer ([address]  CVA (cerebral vascular accident) ([address]  Deafness in left ear  Deficiency anemia  Depression  Diabetes mellitus  Essential hypertension  H/O difficult intubation  Headache(784.0)  Hematuria  Hypertension  Kidney stone  Malignant neoplasm without specification of site [facility])  Melanoma ([address] 2011 Right leg; Dr Avalos Sever  Microscopic hematuria  MRSA (methicillin resistant staph aureus) culture positive  Orthostatic hypotension  Ovarian cancer ([address]  Recurrent UTI  Right flank pain  S/P inguinal hernia repair using synthetic patch  Sick sinus syndrome ([address]  Sleep apnea  Stroke ([address]  Uterine cancer ([address]  UTI (urinary tract infection) Past Surgical History: 
Past Surgical History:  
Procedure Laterality Date  BIOPSY LOWER LEG  2011  
 upper right leg  BREAST SURGERY PROCEDURE UNLISTED  2007  
 partial mysectomy  COLONOSCOPY N/A 12/14/2016 COLONOSCOPY w/bx w/ cautery w/ polypectomy  performed by Rohan Brand MD at [facility] ENDOSCOPY  ENDOSCOPY, COLON, DIAGNOSTIC    
 HX BREAST BIOPSY  2003, 2005, 2007, 2014 L breast  
 HX CHOLECYSTECTOMY  1992  HX GASTRIC BYPASS  1990's  
 3 repairs  HX GYN  2009  
 abnormal pap  HX HERNIA REPAIR  3/08/2008  
 after intestional bypass (12)  HX HYSTERECTOMY [address] [facility] Ovarian cancer , pt received radiation at [facility], [place] [address]  
 throat  ID MASTECTOMY, PARTIAL  11/2004  REDUCTION OF LARGE BREAST  1990  
 50lbs removed [place] ovarian Family History: 
Family History Problem Relation Age of Onset  Cancer Mother   
  breast  
 Hypertension Mother  Diabetes Mother  Cancer Father   
  all  Cancer Sister   
  melinoma  Cancer Maternal Aunt   
  breast, bladder  Diabetes Maternal Grandmother  Cancer Maternal Grandfather  Cancer Paternal Grandmother   
  leukemia  Cancer Paternal Grandfather   
  melinoma Social History: 
Social History Substance Use Topics  Smoking status: Former Smoker Years: 4.00 Quit date: 1/1/1989  Smokeless tobacco: Never Used  Alcohol use No  
 
 
Allergies: Allergies Allergen Reactions  Other Food Hives Peanut butter, strawberries  Other Medication Anaphylaxis Anesthesia makes her throat collapsed twice- [address] Red (Food Color) Hives, Swelling and Shortness of Breath All red foods including red sauce -- pills must be white  Valium [Diazepam] Anaphylaxis Throat collapses  Zithromax [Azithromycin] Anaphylaxis  Benzalkonium Chloride Other (comments) Blister  Codeine Other (comments)  Doxycycline Swelling  Gentamicin Rash  Hydromorphone Other (comments) Other reaction(s): other/intolerance Extreme pain Extreme pain Extreme pain  Milk Containing Products Other (comments) Except cheese  Morphine Hives She cant take the blue pills  Neosporin [Neomycin-Bacitracin-Polymyxin] Rash  Nitrofurantoin Swelling  Omeprazole Other (comments)  Percocet [Oxycodone-Acetaminophen] Anaphylaxis  Phenazopyridine Hives  Seafood Angioedema  Sulfamethoxazole-Trimethoprim Hives and Itching  Sulfur Hives BACTRIM  
 Tramadol Anaphylaxis Review of Systems Review of Systems HENT: Positive for ear discharge (right ear; blood), ear pain (bilateral) and sore throat. Bilaterally swelling near the bilateral ears. Genitourinary: Positive for dysuria (burning). Musculoskeletal: Positive for myalgias (3rd toe left foot). All other systems reviewed and are negative. Physical Exam  
 
Visit Vitals  BP (!) 166/103 (BP 1 Location: Left arm, BP Patient Position: Sitting)  Pulse 94  Temp 98.8 °F (37.1 °C)  Resp 16  
 Ht 5' 7\" (1.702 m)  Wt 135.2 kg (298 lb)  SpO2 97%  BMI 46.67 kg/m2 Physical Exam  
Constitutional: She is oriented to person, place, and time. She appears well-developed and well-nourished. No distress. HENT:  
Head: Normocephalic and atraumatic. Mouth/Throat: Mucous membranes are normal.  
Negative for obvious blood in the EAC's. Eyes: Pupils are equal, round, and reactive to light. Neck: Normal range of motion. Neck supple. Cardiovascular: Normal rate, regular rhythm, normal heart sounds and intact distal pulses. Exam reveals no gallop and no friction rub. No murmur heard. 2+ radial pulses bilaterally. No BLE edema. Pulmonary/Chest: Effort normal and breath sounds normal. No respiratory distress. She has no wheezes. She has no rales. Positive for coarse breath sounds. Abdominal: Soft. Bowel sounds are normal. She exhibits no distension. There is no tenderness. Musculoskeletal: Normal range of motion. Pt is tender to palpation everywhere. Lymphadenopathy:  
No lymphadenopathy. Some parotid enlargement bilaterally. Neurological: She is alert and oriented to person, place, and time. No cranial nerve deficit. Skin: Skin is warm and dry. Nursing note and vitals reviewed. Diagnostic Study Results Labs - Recent Results (from the past 12 hour(s)) URINALYSIS W/ RFLX MICROSCOPIC Collection Time: 09/14/18 11:38 AM  
Result Value Ref Range Color YELLOW Appearance CLEAR Specific gravity 1.018 1.005 - 1.030    
 pH (UA) 5.0 5.0 - 8.0 Protein NEGATIVE  NEG mg/dL Glucose NEGATIVE  NEG mg/dL Ketone NEGATIVE  NEG mg/dL Bilirubin NEGATIVE  NEG Blood SMALL (A) NEG Urobilinogen 0.2 0.2 - 1.0 EU/dL Nitrites NEGATIVE  NEG Leukocyte Esterase TRACE (A) NEG    
STREP THROAT SCREEN Collection Time: 09/14/18 11:40 AM  
Result Value Ref Range Special Requests: NO SPECIAL REQUESTS Strep Screen NEGATIVE Culture result: PENDING Radiologic Studies - No orders to display Medical Decision Making I am the first provider for this patient. I reviewed the vital signs, available nursing notes, past medical history, past surgical history, family history and social history. Vital Signs-Reviewed the patient's vital signs. Records Reviewed: Nursing Notes (Time of Review: 12:12 PM) 
 
ED Course: Progress Notes, Reevaluation, and Consults: 
 
 
Provider Notes (Medical Decision Making): MDM Number of Diagnoses or Management Options Urinary tract infection without hematuria, site unspecified:  
Diagnosis management comments: Diff dx: viral syndrome, strep pharyngitis, uti Pt has many complaints, in pain management, hurting everywhere. Vs stable, no signs of strep w no cobblestoning, neg strep, UTI ? positive with + leuks, + bacteria, +wbcs so will treat with keflex and send culture (prior have been sensitive). Toe pain, no felon, no erythema, pink and warm to touch w no signs of arterial occlusion, no sign of cellulitis, no sign of ingrown toenail, rec pt f/u with podiatry for this complaint. TMs clear, no sign of AOM, based on bilat sx with sore throat, likely viral syndrome. Pt will f/u with pcp [facility] for d/c, careful return precautions given. Pt/family comfortable with plan Tony Saavedra MD 
 
 
 
 
Diagnosis Clinical Impression: No diagnosis found. Disposition:  
 
Follow-up Information None Patient's Medications Start Taking No medications on file Continue Taking ALBUTEROL (PROAIR HFA) 90 MCG/ACTUATION INHALER    Take 1 Puff by inhalation. ALISKIREN (TEKTURNA) 150 MG TABLET    Take  by mouth daily. ALPRAZOLAM (XANAX) 2 MG TABLET      
 AMLODIPINE (NORVASC) 5 MG TABLET      
 CICLOPIROX (PENLAC) 8 % SOLUTION    Apply  to affected area nightly. CLINDAMYCIN (CLEOCIN T) 1 % LOTION      
 CLINDAMYCIN (CLINDAGEL) 1 % TOPICAL GEL      
 DIPHENHYDRAMINE (BENADRYL) 25 MG CAPSULE    Take 1 Cap by Mouth Every 4 Hours As Needed. ERGOCALCIFEROL (VITAMIN D2) 50,000 UNIT CAPSULE    Take 50,000 Units by mouth. FLUOXETINE (PROZAC) 20 MG TABLET FLUZONE QUAD [id number], PF, SYRG INJECTION      
 FUROSEMIDE (LASIX) 20 MG TABLET    Take 20 mg by mouth as needed. HUMULIN N NPH INSULIN KWIKPEN 100 UNIT/ML (3 ML) INPN    10 Units by SubCUTAneous route three (3) times daily (after meals). HYDROCODONE-ACETAMINOPHEN (XODOL) 7.5-300 MG TABLET    Take  by mouth. INSULIN NPH-REGULAR HUMAN REC (NOVOLIN 70/30 INNOLET) 100 UNIT/ML (70-30) FLEXPEN    10 Units by SubCUTAneous route.   
 NALOXONE (NARCAN) 1 MG/ML INJECTION      
 WOOD PEN NEEDLE 32 GAUGE X 5/32\" NDLE      
 NARCAN 4 MG/ACTUATION NASAL SPRAY      
 NITROFURANTOIN, MACROCRYSTAL-MONOHYDRATE, (MACROBID) 100 MG CAPSULE      
 NYSTATIN (MYCOSTATIN) TOPICAL CREAM    Apply  to affected area two (2) times a day. Until healed PHENYLEPHRINE/HYDROCODONE/CP (HYDROCODONE HD PO)    Take  by mouth. SUCRALFATE (CARAFATE) 1 GRAM TABLET    1 g.  
 TRUE METRIX GLUCOSE METER MISC    TEST GGLUCOSE four times a day TRUE METRIX GLUCOSE TEST STRIP STRIP    TEST GLUCOSE four times a day These Medications have changed No medications on file Stop Taking No medications on file  
 
_______________________________ Attestations: 
Scribe Attestation Zakia Varma acting as a scribe for and in the presence of Dorene Rousseau MD     
September 14, 2018 at 12:12 PM 
    
Provider Attestation:     
I personally performed the services described in the documentation, reviewed the documentation, as recorded by the scribe in my presence, and it accurately and completely records my words and actions. September 14, 2018 at 12:12 PM - Dorene Rousseau MD   
_______________________________

## 2018-09-14 NOTE — DISCHARGE INSTRUCTIONS
Urinary Tract Infection in Women: Care Instructions  Your Care Instructions    A urinary tract infection, or UTI, is a general term for an infection anywhere between the kidneys and the urethra (where urine comes out). Most UTIs are bladder infections. They often cause pain or burning when you urinate. UTIs are caused by bacteria and can be cured with antibiotics. Be sure to complete your treatment so that the infection goes away. Follow-up care is a key part of your treatment and safety. Be sure to make and go to all appointments, and call your doctor if you are having problems. It's also a good idea to know your test results and keep a list of the medicines you take. How can you care for yourself at home? · Take your antibiotics as directed. Do not stop taking them just because you feel better. You need to take the full course of antibiotics. · Drink extra water and other fluids for the next day or two. This may help wash out the bacteria that are causing the infection. (If you have kidney, heart, or liver disease and have to limit fluids, talk with your doctor before you increase your fluid intake.)  · Avoid drinks that are carbonated or have caffeine. They can irritate the bladder. · Urinate often. Try to empty your bladder each time. · To relieve pain, take a hot bath or lay a heating pad set on low over your lower belly or genital area. Never go to sleep with a heating pad in place. To prevent UTIs  · Drink plenty of water each day. This helps you urinate often, which clears bacteria from your system. (If you have kidney, heart, or liver disease and have to limit fluids, talk with your doctor before you increase your fluid intake.)  · Urinate when you need to. · Urinate right after you have sex. · Change sanitary pads often. · Avoid douches, bubble baths, feminine hygiene sprays, and other feminine hygiene products that have deodorants.   · After going to the bathroom, wipe from front to back.  When should you call for help? Call your doctor now or seek immediate medical care if:    · Symptoms such as fever, chills, nausea, or vomiting get worse or appear for the first time.     · You have new pain in your back just below your rib cage. This is called flank pain.     · There is new blood or pus in your urine.     · You have any problems with your antibiotic medicine.    Watch closely for changes in your health, and be sure to contact your doctor if:    · You are not getting better after taking an antibiotic for 2 days.     · Your symptoms go away but then come back. Where can you learn more? Go to http://sudhakar-christen.info/. Enter N912 in the search box to learn more about \"Urinary Tract Infection in Women: Care Instructions. \"  Current as of: May 12, 2017  Content Version: 11.7  © 5103-4024 [employer]. Care instructions adapted under license by [employer] (which disclaims liability or warranty for this information). If you have questions about a medical condition or this instruction, always ask your healthcare professional. [address] any warranty or liability for your use of this information.

## 2018-09-14 NOTE — ED TRIAGE NOTES
\"I have an UTI, an ingrown toenail, sore throat, and my ears hurt. \" 
UTI x 4 days Ingrown toenail x 3 days Sore throat started this AM 
Ear pain started this AM

## 2018-09-16 LAB
B-HEM STREP THROAT QL CULT: NEGATIVE
BACTERIA SPEC CULT: NORMAL
SERVICE CMNT-IMP: NORMAL

## 2018-10-07 ENCOUNTER — HOSPITAL ENCOUNTER (EMERGENCY)
Age: 63
Discharge: HOME | End: 2018-10-07
Attending: EMERGENCY MEDICINE
Payer: MEDICAID

## 2018-10-07 VITALS
BODY MASS INDEX: 45.99 KG/M2 | RESPIRATION RATE: 18 BRPM | TEMPERATURE: 98.3 F | HEART RATE: 78 BPM | SYSTOLIC BLOOD PRESSURE: 138 MMHG | OXYGEN SATURATION: 97 % | WEIGHT: 293 LBS | HEIGHT: 67 IN | DIASTOLIC BLOOD PRESSURE: 82 MMHG

## 2018-10-07 DIAGNOSIS — R03.0 ELEVATED BLOOD PRESSURE READING: ICD-10-CM

## 2018-10-07 DIAGNOSIS — L03.114 CELLULITIS OF LEFT UPPER EXTREMITY: Primary | ICD-10-CM

## 2018-10-07 PROCEDURE — 74011250637 HC RX REV CODE- 250/637: Performed by: PHYSICIAN ASSISTANT

## 2018-10-07 PROCEDURE — 99282 EMERGENCY DEPT VISIT SF MDM: CPT

## 2018-10-07 RX ORDER — CEPHALEXIN 250 MG/1
500 CAPSULE ORAL
Status: COMPLETED | OUTPATIENT
Start: 2018-10-07 | End: 2018-10-07

## 2018-10-07 RX ORDER — CEPHALEXIN 500 MG/1
500 CAPSULE ORAL 4 TIMES DAILY
Qty: 28 CAP | Refills: 0 | Status: SHIPPED | OUTPATIENT
Start: 2018-10-07 | End: 2018-10-14

## 2018-10-07 RX ORDER — CEPHALEXIN 500 MG/1
500 CAPSULE ORAL 4 TIMES DAILY
Qty: 28 CAP | Refills: 0 | Status: SHIPPED | OUTPATIENT
Start: 2018-10-07 | End: 2018-10-07

## 2018-10-07 RX ADMIN — CEPHALEXIN 500 MG: 250 CAPSULE ORAL at 17:27

## 2018-10-07 NOTE — ED PROVIDER NOTES
EMERGENCY DEPARTMENT HISTORY AND PHYSICAL EXAM 
 
6:36 PM 
 
 
Date: 10/7/2018 Patient Name: Winston Amato History of Presenting Illness Chief Complaint Patient presents with  Arm Pain History Provided By: Patient Chief Complaint: Arm pain Duration: 3 Days Timing:  Constant and Worsening Location: Left arm Quality: Burning Severity: 9 out of 10 Modifying Factors: No modifying or aggravating factors were reported. Associated Symptoms: rash, fever, chills, \"hot legs,\" back pain Additional History (Context): 6:39 PM Winston Amato is a 61 y.o. female with h/o HTN, DM, chronic pain, arthritis, cancer, and other noted PMHx who presents to ED complaining of constant, worsening, severe, burning left arm pain onset 3 days, with associated rash, fever and chills. The patient reports that the red rash started on her left arm after receiving her flu shot and shingles shot. The patient believes that the redness is due to an allergic reaction. No other concerns or symptoms at this time. PCP: Marcin Davila MD 
 
 
 
Current Outpatient Prescriptions Medication Sig Dispense Refill  cephALEXin (KEFLEX) 500 mg capsule Take 1 Cap by mouth four (4) times daily for 7 days. 28 Cap 0  
 nystatin (MYCOSTATIN) topical cream Apply  to affected area two (2) times a day. 15 g 0  
 HUMULIN N NPH INSULIN KWIKPEN 100 unit/mL (3 mL) inpn 10 Units by SubCUTAneous route three (3) times daily (after meals). 5 Adjustable Dose Pre-filled Pen Syringe 0  
 naloxone (NARCAN) 1 mg/mL injection  nystatin (MYCOSTATIN) topical cream Apply  to affected area two (2) times a day. Until healed 15 g 6  ALPRAZolam (XANAX) 2 mg tablet  amLODIPine (NORVASC) 5 mg tablet  diphenhydrAMINE (BENADRYL) 25 mg capsule Take 1 Cap by Mouth Every 4 Hours As Needed.  clindamycin (CLINDAGEL) 1 % topical gel  FLUZONE QUAD 1340-2283, PF, syrg injection  NARCAN 4 mg/actuation nasal spray     
 nitrofurantoin, macrocrystal-monohydrate, (MACROBID) 100 mg capsule  sucralfate (CARAFATE) 1 gram tablet 1 g.    
 HYDROcodone-acetaminophen (XODOL) 7.5-300 mg tablet Take  by mouth.  TRUE METRIX GLUCOSE TEST STRIP strip TEST GLUCOSE four times a day  0  
 TRUE METRIX GLUCOSE METER misc TEST GGLUCOSE four times a day  0  
 clindamycin (CLEOCIN T) 1 % lotion  FLUoxetine (PROZAC) 20 mg tablet  WOOD PEN NEEDLE 32 gauge x 5/32\" ndle   0  
 PHENYLEPHRINE/HYDROCODONE/CP (HYDROCODONE HD PO) Take  by mouth.  ergocalciferol (VITAMIN D2) 50,000 unit capsule Take 50,000 Units by mouth.  ciclopirox (PENLAC) 8 % solution Apply  to affected area nightly.  furosemide (LASIX) 20 mg tablet Take 20 mg by mouth as needed.  insulin nph-regular human rec (NOVOLIN 70/30 INNOLET) 100 unit/mL (70-30) flexpen 10 Units by SubCUTAneous route.  aliskiren (TEKTURNA) 150 mg tablet Take  by mouth daily.  albuterol (PROAIR HFA) 90 mcg/Actuation inhaler Take 1 Puff by inhalation. Past History Past Medical History: 
Past Medical History:  
Diagnosis Date  Abuse  Acute lower UTI (urinary tract infection)  Anemia NEC  Anxiety disorder  Arrhythmia  Arthritis  Arthropathy  Asthma  Benign tumor of throat  Breast cancer ([address] right breast  
 Broken ankle 1960 Right- loss argenis bone  Cancer ([address]  Cardiac arrhythmia  Chronic pain  Colon cancer ([address]  CVA (cerebral vascular accident) ([address]  Deafness in left ear  Deficiency anemia  Depression  Diabetes mellitus  Essential hypertension  H/O difficult intubation  Headache(784.0)  Hematuria  Hypertension  Kidney stone  Malignant neoplasm without specification of site [facility])  Melanoma ([address] 2011 Right leg; Dr Blade Lantigua  Microscopic hematuria  MRSA (methicillin resistant staph aureus) culture positive  Orthostatic hypotension  [facility]  Pain management  Recurrent UTI  Right flank pain  S/P inguinal hernia repair using synthetic patch  Sick sinus syndrome ([address]  Sleep apnea  Stroke ([facility]  Uterine cancer ([facility]  UTI (urinary tract infection) Past Surgical History: 
Past Surgical History:  
Procedure Laterality Date  BIOPSY LOWER LEG  2011  
 upper right leg  BREAST SURGERY PROCEDURE UNLISTED  2007  
 partial mysectomy  COLONOSCOPY N/A 12/14/2016 COLONOSCOPY w/bx w/ cautery w/ polypectomy  performed by Carmen Rod MD at [facility] ENDOSCOPY  ENDOSCOPY, COLON, DIAGNOSTIC    
 HX BREAST BIOPSY  2003, 2005, 2007, 2014 L breast  
 HX CHOLECYSTECTOMY  1992  HX GASTRIC BYPASS  1990's  
 3 repairs  HX GYN  2009  
 abnormal pap  HX HERNIA REPAIR  3/08/2008  
 after intestional bypass (12)  HX HYSTERECTOMY [address] [facility] Ovarian cancer , pt received radiation at [facility]  
 throat  [place] MASTECTOMY, PARTIAL  11/2004  REDUCTION OF LARGE BREAST  1990  
 50lbs removed [address] ovarian Family History: 
Family History Problem Relation Age of Onset  Cancer Mother   
  breast  
 Hypertension Mother  Diabetes Mother  Cancer Father   
  all  Cancer Sister   
  melinoma  Cancer Maternal Aunt   
  breast, bladder  Diabetes Maternal Grandmother  Cancer Maternal Grandfather  Cancer Paternal Grandmother   
  leukemia  Cancer Paternal Grandfather   
  melinoma Social History: 
Social History Substance Use Topics  Smoking status: Former Smoker Years: 4.00 Quit date: 1/1/1989  Smokeless tobacco: Never Used  Alcohol use No  
 
 
Allergies: Allergies Allergen Reactions  Other Food Hives Peanut butter, strawberries  Other Medication Anaphylaxis Anesthesia makes her throat collapsed twice- [address] Red (Food Color) Hives, Swelling and Shortness of Breath All red foods including red sauce -- pills must be white  Valium [Diazepam] Anaphylaxis Throat collapses  Zithromax [Azithromycin] Anaphylaxis  Benzalkonium Chloride Other (comments) Blister  Codeine Other (comments)  Doxycycline Swelling  Gentamicin Rash  Hydromorphone Other (comments) Other reaction(s): other/intolerance Extreme pain Extreme pain Extreme pain  Milk Containing Products Other (comments) Except cheese  Morphine Hives She cant take the blue pills  Neosporin [Neomycin-Bacitracin-Polymyxin] Rash  Nitrofurantoin Swelling  Omeprazole Other (comments)  Percocet [Oxycodone-Acetaminophen] Anaphylaxis  Phenazopyridine Hives  Seafood Angioedema  Sulfamethoxazole-Trimethoprim Hives and Itching  Sulfur Hives BACTRIM  
 Tramadol Anaphylaxis Review of Systems Review of Systems Constitutional: Positive for chills and fever. Respiratory: Negative for shortness of breath. Cardiovascular: Negative for chest pain. Gastrointestinal: Negative for abdominal pain, nausea and vomiting. Skin: Positive for rash. Neurological: Negative for weakness. All other systems reviewed and are negative. Physical Exam  
 
Visit Vitals  /82  Pulse 78  Temp 98.3 °F (36.8 °C)  Resp 18  Ht 5' 7\" (1.702 m)  Wt 134.7 kg (297 lb)  SpO2 97%  Breastfeeding No  
 BMI 46.52 kg/m2 Physical Exam  
Constitutional: She appears well-developed and well-nourished. No distress. HENT:  
Head: Normocephalic and atraumatic. Neck: Normal range of motion. Neck supple. Cardiovascular: Normal rate, regular rhythm, normal heart sounds and intact distal pulses. Exam reveals no gallop and no friction rub. No murmur heard. Pulmonary/Chest: Effort normal and breath sounds normal. No respiratory distress. She has no wheezes. She has no rales. Musculoskeletal: Normal range of motion. Neurological: She is alert. Skin: Skin is warm. Rash noted. She is not diaphoretic. Nursing note and vitals reviewed. Diagnostic Study Results Labs - No results found for this or any previous visit (from the past 12 hour(s)). Radiologic Studies - No orders to display Medical Decision Making I am the first provider for this patient. I reviewed the vital signs, available nursing notes, past medical history, past surgical history, family history and social history. Vital Signs-Reviewed the patient's vital signs. Records Reviewed: Nursing Notes (Time of Review: 6:36 PM) 
 
ED Course: Progress Notes, Reevaluation, and Consults: 
5:09 PM Reviewed plan with patient. Outlined area of erythema with marking pen. Discussed need for close outpatient follow-up. Discussed strict return precautions, including fever, increased redness outside of the borders of the marking pen, or any other medical concerns. Discussed need to follow-up with PMD within 2 days for reassessment. Provider Notes (Medical Decision Making): MDM 62 yo F who presents due to L upper extremity erythema and warmth after flu and shingles shot. No other symptoms. Possibly site reaction, but will cover for cellulitis as well. No streaking, no fluctuance. Afebrile, VS stable. Outlined area of erythema with marking pen. Pt will f/up with PMD within 2 days for reassessment. Diagnosis Clinical Impression: 1. Cellulitis of left upper extremity 2. Elevated blood pressure reading Disposition: home Follow-up Information Follow up With Details Comments Contact Info [facility] EMERGENCY DEPT  If symptoms worsen [address] 
[phone]  Alison Yan MD In 2 days  [address] [facility] [address] 
 [address] [address] 
[phone] Discharge Medication List as of 10/7/2018  5:31 PM  
  
START taking these medications Details  
cephALEXin (KEFLEX) 500 mg capsule Take 1 Cap by mouth four (4) times daily for 7 days. , Normal, Disp-28 Cap, R-0  
  
  
CONTINUE these medications which have NOT CHANGED Details  
!! nystatin (MYCOSTATIN) topical cream Apply  to affected area two (2) times a day., Print, Disp-15 g, R-0  
  
HUMULIN N NPH INSULIN KWIKPEN 100 unit/mL (3 mL) inpn 10 Units by SubCUTAneous route three (3) times daily (after meals). , Print, Disp-5 Adjustable Dose Pre-filled Pen Syringe, R-0, LIZETH  
  
naloxone (NARCAN) 1 mg/mL injection Historical Med  
  
!! nystatin (MYCOSTATIN) topical cream Apply  to affected area two (2) times a day. Until healed, Normal, Disp-15 g, R-6 ALPRAZolam (XANAX) 2 mg tablet Historical Med  
  
amLODIPine (NORVASC) 5 mg tablet Historical Med  
  
diphenhydrAMINE (BENADRYL) 25 mg capsule Take 1 Cap by Mouth Every 4 Hours As Needed. , Historical Med  
  
clindamycin (CLINDAGEL) 1 % topical gel Historical Med FLUZONE QUAD 6765-0344, PF, syrg injection Historical Med, LIZETH  
  
NARCAN 4 mg/actuation nasal spray Historical Med, LIZETH  
  
nitrofurantoin, macrocrystal-monohydrate, (MACROBID) 100 mg capsule Historical Med  
  
sucralfate (CARAFATE) 1 gram tablet 1 g., Historical Med HYDROcodone-acetaminophen (XODOL) 7.5-300 mg tablet Take  by mouth., Historical Med  
  
TRUE METRIX GLUCOSE TEST STRIP strip TEST GLUCOSE four times a day, Historical Med, R-0, LIZETH  
  
TRUE METRIX GLUCOSE METER misc TEST GGLUCOSE four times a day, Historical Med, R-0, LIZETH  
  
clindamycin (CLEOCIN T) 1 % lotion Historical Med FLUoxetine (PROZAC) 20 mg tablet Historical Med WOOD PEN NEEDLE 32 gauge x 5/32\" ndle Historical Med, R-0, LIZETH PHENYLEPHRINE/HYDROCODONE/CP (HYDROCODONE HD PO) Take  by mouth., Historical Med  
  
 ergocalciferol (VITAMIN D2) 50,000 unit capsule Take 50,000 Units by mouth., Historical Med  
  
ciclopirox (PENLAC) 8 % solution Apply  to affected area nightly., Historical Med  
  
furosemide (LASIX) 20 mg tablet Take 20 mg by mouth as needed., Historical Med  
  
insulin nph-regular human rec (NOVOLIN 70/30 INNOLET) 100 unit/mL (70-30) flexpen 10 Units by SubCUTAneous route., Historical Med  
  
aliskiren (TEKTURNA) 150 mg tablet Take  by mouth daily. , Historical Med  
  
albuterol (PROAIR HFA) 90 mcg/Actuation inhaler Take 1 Puff by inhalation. , Historical Med  
  
 !! - Potential duplicate medications found. Please discuss with provider.  
  
 
_______________________________ Attestations: 
Scribe Attestation Ryann Cooper acting as a scribe for and in the presence of Donna Garcia October 07, 2018 at 6:36 PM 
    
Provider Attestation:     
I personally performed the services described in the documentation, reviewed the documentation, as recorded by the scribe in my presence, and it accurately and completely records my words and actions. October 07, 2018 at 6:36 PM - JO ANN Garcia 
_______________________________

## 2019-03-07 ENCOUNTER — APPOINTMENT (OUTPATIENT)
Dept: GENERAL RADIOLOGY | Age: 64
End: 2019-03-07
Attending: EMERGENCY MEDICINE
Payer: MEDICAID

## 2019-03-07 ENCOUNTER — HOSPITAL ENCOUNTER (EMERGENCY)
Age: 64
Discharge: HOME | End: 2019-03-07
Attending: EMERGENCY MEDICINE
Payer: MEDICAID

## 2019-03-07 VITALS
DIASTOLIC BLOOD PRESSURE: 83 MMHG | HEIGHT: 67 IN | RESPIRATION RATE: 21 BRPM | BODY MASS INDEX: 45.52 KG/M2 | WEIGHT: 290 LBS | TEMPERATURE: 98.6 F | SYSTOLIC BLOOD PRESSURE: 141 MMHG | HEART RATE: 103 BPM | OXYGEN SATURATION: 98 %

## 2019-03-07 DIAGNOSIS — J18.9 PNEUMONIA OF RIGHT LOWER LOBE DUE TO INFECTIOUS ORGANISM: ICD-10-CM

## 2019-03-07 DIAGNOSIS — J02.9 ACUTE PHARYNGITIS, UNSPECIFIED ETIOLOGY: Primary | ICD-10-CM

## 2019-03-07 LAB
ALBUMIN SERPL-MCNC: 3.5 G/DL (ref 3.4–5)
ALBUMIN/GLOB SERPL: 0.9 {RATIO} (ref 0.8–1.7)
ALP SERPL-CCNC: 67 U/L (ref 45–117)
ALT SERPL-CCNC: 15 U/L (ref 13–56)
ANION GAP SERPL CALC-SCNC: 6 MMOL/L (ref 3–18)
AST SERPL-CCNC: 13 U/L (ref 15–37)
ATRIAL RATE: 106 BPM
BASOPHILS # BLD: 0 K/UL (ref 0–0.1)
BASOPHILS NFR BLD: 0 % (ref 0–2)
BILIRUB SERPL-MCNC: 0.9 MG/DL (ref 0.2–1)
BUN SERPL-MCNC: 14 MG/DL (ref 7–18)
BUN/CREAT SERPL: 17 (ref 12–20)
CALCIUM SERPL-MCNC: 7.9 MG/DL (ref 8.5–10.1)
CALCULATED P AXIS, ECG09: 70 DEGREES
CALCULATED R AXIS, ECG10: -27 DEGREES
CALCULATED T AXIS, ECG11: 52 DEGREES
CHLORIDE SERPL-SCNC: 101 MMOL/L (ref 100–108)
CO2 SERPL-SCNC: 31 MMOL/L (ref 21–32)
CREAT SERPL-MCNC: 0.83 MG/DL (ref 0.6–1.3)
DIAGNOSIS, 93000: NORMAL
DIFFERENTIAL METHOD BLD: ABNORMAL
EOSINOPHIL # BLD: 0 K/UL (ref 0–0.4)
EOSINOPHIL NFR BLD: 0 % (ref 0–5)
ERYTHROCYTE [DISTWIDTH] IN BLOOD BY AUTOMATED COUNT: 13.5 % (ref 11.6–14.5)
FLUAV AG NPH QL IA: NEGATIVE
FLUBV AG NOSE QL IA: NEGATIVE
GLOBULIN SER CALC-MCNC: 3.8 G/DL (ref 2–4)
GLUCOSE SERPL-MCNC: 95 MG/DL (ref 74–99)
HCT VFR BLD AUTO: 42.1 % (ref 35–45)
HGB BLD-MCNC: 13.6 G/DL (ref 12–16)
LYMPHOCYTES # BLD: 0.7 K/UL (ref 0.9–3.6)
LYMPHOCYTES NFR BLD: 13 % (ref 21–52)
MCH RBC QN AUTO: 29 PG (ref 24–34)
MCHC RBC AUTO-ENTMCNC: 32.3 G/DL (ref 31–37)
MCV RBC AUTO: 89.8 FL (ref 74–97)
MONOCYTES # BLD: 0.5 K/UL (ref 0.05–1.2)
MONOCYTES NFR BLD: 9 % (ref 3–10)
NEUTS SEG # BLD: 4.2 K/UL (ref 1.8–8)
NEUTS SEG NFR BLD: 78 % (ref 40–73)
P-R INTERVAL, ECG05: 200 MS
PLATELET # BLD AUTO: 199 K/UL (ref 135–420)
PMV BLD AUTO: 10.8 FL (ref 9.2–11.8)
POTASSIUM SERPL-SCNC: 3.9 MMOL/L (ref 3.5–5.5)
PROT SERPL-MCNC: 7.3 G/DL (ref 6.4–8.2)
Q-T INTERVAL, ECG07: 342 MS
QRS DURATION, ECG06: 100 MS
QTC CALCULATION (BEZET), ECG08: 454 MS
RBC # BLD AUTO: 4.69 M/UL (ref 4.2–5.3)
SODIUM SERPL-SCNC: 138 MMOL/L (ref 136–145)
VENTRICULAR RATE, ECG03: 106 BPM
WBC # BLD AUTO: 5.4 K/UL (ref 4.6–13.2)

## 2019-03-07 PROCEDURE — 74011000250 HC RX REV CODE- 250: Performed by: EMERGENCY MEDICINE

## 2019-03-07 PROCEDURE — 77030029684 HC NEB SM VOL KT MONA -A

## 2019-03-07 PROCEDURE — 74011250637 HC RX REV CODE- 250/637: Performed by: EMERGENCY MEDICINE

## 2019-03-07 PROCEDURE — 87804 INFLUENZA ASSAY W/OPTIC: CPT

## 2019-03-07 PROCEDURE — 87081 CULTURE SCREEN ONLY: CPT

## 2019-03-07 PROCEDURE — 80053 COMPREHEN METABOLIC PANEL: CPT

## 2019-03-07 PROCEDURE — 85025 COMPLETE CBC W/AUTO DIFF WBC: CPT

## 2019-03-07 PROCEDURE — 71046 X-RAY EXAM CHEST 2 VIEWS: CPT

## 2019-03-07 PROCEDURE — 94640 AIRWAY INHALATION TREATMENT: CPT

## 2019-03-07 PROCEDURE — 93005 ELECTROCARDIOGRAM TRACING: CPT

## 2019-03-07 PROCEDURE — 99285 EMERGENCY DEPT VISIT HI MDM: CPT

## 2019-03-07 RX ORDER — AMOXICILLIN AND CLAVULANATE POTASSIUM 875; 125 MG/1; MG/1
1 TABLET, FILM COATED ORAL
Status: COMPLETED | OUTPATIENT
Start: 2019-03-07 | End: 2019-03-07

## 2019-03-07 RX ORDER — BENZONATATE 100 MG/1
200 CAPSULE ORAL
Status: COMPLETED | OUTPATIENT
Start: 2019-03-07 | End: 2019-03-07

## 2019-03-07 RX ORDER — AMOXICILLIN AND CLAVULANATE POTASSIUM 875; 125 MG/1; MG/1
1 TABLET, FILM COATED ORAL 2 TIMES DAILY
Qty: 28 TAB | Refills: 0 | Status: SHIPPED | OUTPATIENT
Start: 2019-03-07 | End: 2019-03-21

## 2019-03-07 RX ORDER — IPRATROPIUM BROMIDE AND ALBUTEROL SULFATE 2.5; .5 MG/3ML; MG/3ML
3 SOLUTION RESPIRATORY (INHALATION) ONCE
Status: COMPLETED | OUTPATIENT
Start: 2019-03-07 | End: 2019-03-07

## 2019-03-07 RX ADMIN — IPRATROPIUM BROMIDE AND ALBUTEROL SULFATE 3 ML: .5; 3 SOLUTION RESPIRATORY (INHALATION) at 02:30

## 2019-03-07 RX ADMIN — AMOXICILLIN AND CLAVULANATE POTASSIUM 1 TABLET: 875; 125 TABLET, FILM COATED ORAL at 03:58

## 2019-03-07 RX ADMIN — BENZONATATE 200 MG: 100 CAPSULE ORAL at 02:30

## 2019-03-07 NOTE — DISCHARGE INSTRUCTIONS
SPECIFIC PATIENT INSTRUCTIONS FROM THE PHYSICIAN WHO TREATED YOU IN THE ER TODAY:  1. Augmentin as prescribed. 2. Cough syrup for cough. 3. Tylenol if fever develops. 4. Return if worse. 5. Reevaluation by your doctor in next 2-3 days if not improving. Patient Education        Pneumonia: Care Instructions  Your Care Instructions    Pneumonia is an infection of the lungs. Most cases are caused by infections from bacteria or viruses. Pneumonia may be mild or very severe. If it is caused by bacteria, you will be treated with antibiotics. It may take a few weeks to a few months to recover fully from pneumonia, depending on how sick you were and whether your overall health is good. Follow-up care is a key part of your treatment and safety. Be sure to make and go to all appointments, and call your doctor if you are having problems. It's also a good idea to know your test results and keep a list of the medicines you take. How can you care for yourself at home? · Take your antibiotics exactly as directed. Do not stop taking the medicine just because you are feeling better. You need to take the full course of antibiotics. · Take your medicines exactly as prescribed. Call your doctor if you think you are having a problem with your medicine. · Get plenty of rest and sleep. You may feel weak and tired for a while, but your energy level will improve with time. · To prevent dehydration, drink plenty of fluids, enough so that your urine is light yellow or clear like water. Choose water and other caffeine-free clear liquids until you feel better. If you have kidney, heart, or liver disease and have to limit fluids, talk with your doctor before you increase the amount of fluids you drink. · Take care of your cough so you can rest. A cough that brings up mucus from your lungs is common with pneumonia. It is one way your body gets rid of the infection.  But if coughing keeps you from resting or causes severe fatigue and chest-wall pain, talk to your doctor. He or she may suggest that you take a medicine to reduce the cough. · Use a vaporizer or humidifier to add moisture to your bedroom. Follow the directions for cleaning the machine. · Do not smoke or allow others to smoke around you. Smoke will make your cough last longer. If you need help quitting, talk to your doctor about stop-smoking programs and medicines. These can increase your chances of quitting for good. · Take an over-the-counter pain medicine, such as acetaminophen (Tylenol), ibuprofen (Advil, Motrin), or naproxen (Aleve). Read and follow all instructions on the label. · Do not take two or more pain medicines at the same time unless the doctor told you to. Many pain medicines have acetaminophen, which is Tylenol. Too much acetaminophen (Tylenol) can be harmful. · If you were given a spirometer to measure how well your lungs are working, use it as instructed. This can help your doctor tell how your recovery is going. · To prevent pneumonia in the future, talk to your doctor about getting a flu vaccine (once a year) and a pneumococcal vaccine (one time only for most people). When should you call for help? Call 911 anytime you think you may need emergency care. For example, call if:    · You have severe trouble breathing.    Call your doctor now or seek immediate medical care if:    · You cough up dark brown or bloody mucus (sputum).     · You have new or worse trouble breathing.     · You are dizzy or lightheaded, or you feel like you may faint.    Watch closely for changes in your health, and be sure to contact your doctor if:    · You have a new or higher fever.     · You are coughing more deeply or more often.     · You are not getting better after 2 days (48 hours).     · You do not get better as expected. Where can you learn more? Go to http://sudhakar-christen.info/.   Enter [id number] in the search box to learn more about \"Pneumonia: Care Instructions. \"  Current as of: 2018  Content Version: 11.9  © 2504-4835 [employer]. Care instructions adapted under license by [employer] (which disclaims liability or warranty for this information). If you have questions about a medical condition or this instruction, always ask your healthcare professional. [address] any warranty or liability for your use of this information. [employer] Activation    Thank you for requesting access to [employer]. Please follow the instructions below to securely access and download your online medical record. Zite allows you to send messages to your doctor, view your test results, renew your prescriptions, schedule appointments, and more. How Do I Sign Up? 1. In your internet browser, go to [email or web address]. 2. Click on the First Time User? Click Here link in the Sign In box. You will see the New Member Sign Up page. 3. Enter your Zite Access Code exactly as it appears below. You will not need to use this code after youve completed the sign-up process. If you do not sign up before the expiration date, you must request a new code. Zite Access Code: Activation code not generated  Current Zite Status: Active (This is the date your Zite access code will )    4. Enter the last four digits of your Social Security Number (xxxx) and Date of Birth (mm/dd/yyyy) as indicated and click Submit. You will be taken to the next sign-up page. 5. Create a Zite ID. This will be your Zite login ID and cannot be changed, so think of one that is secure and easy to remember. 6. Create a Zite password. You can change your password at any time. 7. Enter your Password Reset Question and Answer. This can be used at a later time if you forget your password. 8. Enter your e-mail address. You will receive e-mail notification when new information is available in [address]   9. Click Sign Up. You can now view and download portions of your medical record. 10. Click the Download Summary menu link to download a portable copy of your medical information. Additional Information    If you have questions, please visit the Frequently Asked Questions section of the [employer] website at [email or web address] Remember, [employer] is NOT to be used for urgent needs. For medical emergencies, dial 911.
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
## 2019-03-07 NOTE — ED TRIAGE NOTES
Productive cough, fever, generalized body aches and shortness of breath x1 day. \"I also had orange stool today.  I took half a Norco around 10pm.\"

## 2019-03-09 LAB
B-HEM STREP THROAT QL CULT: NEGATIVE
BACTERIA SPEC CULT: NORMAL
SERVICE CMNT-IMP: NORMAL

## 2019-03-13 ENCOUNTER — APPOINTMENT (OUTPATIENT)
Dept: GENERAL RADIOLOGY | Age: 64
End: 2019-03-13
Attending: PHYSICIAN ASSISTANT
Payer: MEDICAID

## 2019-03-13 ENCOUNTER — HOSPITAL ENCOUNTER (EMERGENCY)
Age: 64
Discharge: HOME | End: 2019-03-13
Attending: EMERGENCY MEDICINE
Payer: MEDICAID

## 2019-03-13 VITALS
HEART RATE: 69 BPM | WEIGHT: 284 LBS | SYSTOLIC BLOOD PRESSURE: 154 MMHG | BODY MASS INDEX: 44.57 KG/M2 | RESPIRATION RATE: 16 BRPM | TEMPERATURE: 98 F | HEIGHT: 67 IN | DIASTOLIC BLOOD PRESSURE: 100 MMHG | OXYGEN SATURATION: 95 %

## 2019-03-13 DIAGNOSIS — R03.0 ELEVATED BLOOD PRESSURE READING: ICD-10-CM

## 2019-03-13 DIAGNOSIS — J06.9 ACUTE UPPER RESPIRATORY INFECTION: Primary | ICD-10-CM

## 2019-03-13 PROCEDURE — 99283 EMERGENCY DEPT VISIT LOW MDM: CPT

## 2019-03-13 PROCEDURE — 74011000250 HC RX REV CODE- 250: Performed by: PHYSICIAN ASSISTANT

## 2019-03-13 PROCEDURE — 77030029684 HC NEB SM VOL KT MONA -A

## 2019-03-13 PROCEDURE — 74011250637 HC RX REV CODE- 250/637: Performed by: PHYSICIAN ASSISTANT

## 2019-03-13 PROCEDURE — 94640 AIRWAY INHALATION TREATMENT: CPT

## 2019-03-13 PROCEDURE — 71046 X-RAY EXAM CHEST 2 VIEWS: CPT

## 2019-03-13 RX ORDER — BENZONATATE 100 MG/1
200 CAPSULE ORAL
Status: COMPLETED | OUTPATIENT
Start: 2019-03-13 | End: 2019-03-13

## 2019-03-13 RX ORDER — BENZONATATE 100 MG/1
100 CAPSULE ORAL
Qty: 21 CAP | Refills: 0 | Status: SHIPPED | OUTPATIENT
Start: 2019-03-13 | End: 2019-03-20

## 2019-03-13 RX ORDER — FLUCONAZOLE 150 MG/1
150 TABLET ORAL
Qty: 1 TAB | Refills: 0 | Status: SHIPPED | OUTPATIENT
Start: 2019-03-13 | End: 2019-03-13

## 2019-03-13 RX ORDER — IPRATROPIUM BROMIDE AND ALBUTEROL SULFATE 2.5; .5 MG/3ML; MG/3ML
3 SOLUTION RESPIRATORY (INHALATION) ONCE
Status: COMPLETED | OUTPATIENT
Start: 2019-03-13 | End: 2019-03-13

## 2019-03-13 RX ADMIN — IPRATROPIUM BROMIDE AND ALBUTEROL SULFATE 3 ML: .5; 3 SOLUTION RESPIRATORY (INHALATION) at 15:28

## 2019-03-13 RX ADMIN — BENZONATATE 200 MG: 100 CAPSULE ORAL at 15:28

## 2019-03-13 NOTE — ED TRIAGE NOTES
Under tx for pnuemonia. Thinks it has gone into other lung. Says now has diarrhea and yeast infection from antibiotics.
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
## 2019-03-13 NOTE — DISCHARGE INSTRUCTIONS
Patient Education      Take medication as prescribed. Follow-up with your primary care physician in 2 days for reassessment. Bring the results from this visit with your for their review. Return to the ED immediately for any new, worsening, or persistent symptoms, including fever, chest pain, shortness of breath, or any other medical concerns. Learning About the Safe Use of Antibiotics  Introduction    Antibiotics are drugs used to kill bacteria. Bacteria can cause infections. These include strep throat, ear infections, and pneumonia. These medicines can't cure everything. They don't kill viruses or help with allergies. They don't help illnesses such as the common cold, the flu, or a runny nose. And they can cause side effects. There are many types of antibiotics. Your doctor will decide which one will work best for your infection. Examples include:  · Amoxicillin. · Cephalexin (Keflex). · Ciprofloxacin (Cipro). What are the possible side effects? Side effects can include:  · Nausea. · Diarrhea. · Skin rash. · Yeast infection. · A severe allergic reaction. It may cause itching, swelling, and breathing problems. This is rare. You may have other side effects or reactions not listed here. Check the information that comes with your medicine. Should you take antibiotics just in case? Don't take antibiotics when you don't need them. If you do that, they may not work when you do need them. Each time you take antibiotics, you are more likely to have some bacteria that survive and aren't killed by the medicine. Bacteria that don't die can change and become even harder to kill. These are called antibiotic-resistant bacteria. They can cause longer and more serious infections. To treat them, you may need different, stronger antibiotics that have more side effects and may cost more. So always ask your doctor if antibiotics are the best treatment.  Explain that you do not want antibiotics unless you need them.  Help protect the community  Using antibiotics when they're not needed leads to the development of antibiotic-resistant bacteria. These tougher bacteria can spread to family members, children, and coworkers. People in your community will have a risk of getting an infection that is harder to cure and that costs more to treat. How can you take antibiotics safely? Be safe with medicine. Take your antibiotics as directed. Do not stop taking them just because you feel better. You need to take the full course of medicine. This will help make sure your infection is cured. It will also help prevent the growth of antibiotic-resistant bacteria. Always take the exact amount that the label says to take. If the label says to take the medicine at a certain time, follow those directions. You might feel better after you take an antibiotic for a few days. But it is important to keep taking it for as long as prescribed. That will help you get rid of those bacteria that are a bit stronger and that survive the first few days of treatment. Where can you learn more? Go to http://sudhakar-christen.info/. Enter F695 in the search box to learn more about \"Learning About the Safe Use of Antibiotics. \"  Current as of: July 30, 2018  Content Version: 11.9  © 6383-6274 [employer]. Care instructions adapted under license by [employer] (which disclaims liability or warranty for this information). If you have questions about a medical condition or this instruction, always ask your healthcare professional. [place] [address] any warranty or liability for your use of this information. Patient Education        Elevated Blood Pressure: Care Instructions  Your Care Instructions    Blood pressure is a measure of how hard the blood pushes against the walls of your arteries. It's normal for blood pressure to go up and down throughout the day.  But if it stays up over time, you have high blood pressure. Two numbers tell you your blood pressure. The first number is the systolic pressure. It shows how hard the blood pushes when your heart is pumping. The second number is the diastolic pressure. It shows how hard the blood pushes between heartbeats, when your heart is relaxed and filling with blood. An ideal blood pressure in adults is less than 120/80 (say \"120 over 80\"). High blood pressure is 140/90 or higher. You have high blood pressure if your top number is 140 or higher or your bottom number is 90 or higher, or both. The main test for high blood pressure is simple, fast, and painless. To diagnose high blood pressure, your doctor will test your blood pressure at different times. After testing your blood pressure, your doctor may ask you to test it again when you are home. If you are diagnosed with high blood pressure, you can work with your doctor to make a long-term plan to manage it. Follow-up care is a key part of your treatment and safety. Be sure to make and go to all appointments, and call your doctor if you are having problems. It's also a good idea to know your test results and keep a list of the medicines you take. How can you care for yourself at home? · Do not smoke. Smoking increases your risk for heart attack and stroke. If you need help quitting, talk to your doctor about stop-smoking programs and medicines. These can increase your chances of quitting for good. · Stay at a healthy weight. · Try to limit how much sodium you eat to less than 2,300 milligrams (mg) a day. Your doctor may ask you to try to eat less than 1,500 mg a day. · Be physically active. Get at least 30 minutes of exercise on most days of the week. Walking is a good choice. You also may want to do other activities, such as running, swimming, cycling, or playing tennis or team sports. · Avoid or limit alcohol. Talk to your doctor about whether you can drink any alcohol.   · Eat plenty of fruits, vegetables, and low-fat dairy products. Eat less saturated and total fats. · Learn how to check your blood pressure at home. When should you call for help? Call your doctor now or seek immediate medical care if:  ? · Your blood pressure is much higher than normal (such as 180/110 or higher). ? · You think high blood pressure is causing symptoms such as:  ¨ Severe headache. ¨ Blurry vision. ? Watch closely for changes in your health, and be sure to contact your doctor if:  ? · You do not get better as expected. Where can you learn more? Go to http://sudhakarFast Assetchristen.info/. Enter T897 in the search box to learn more about \"Elevated Blood Pressure: Care Instructions. \"  Current as of: September 21, 2016  Content Version: 11.4  © 8137-9152 [employer]. Care instructions adapted under license by [employer] (which disclaims liability or warranty for this information). If you have questions about a medical condition or this instruction, always ask your healthcare professional. [place] [address] any warranty or liability for your use of this information. Patient Education        Upper Respiratory Infection (Cold): Care Instructions  Your Care Instructions    An upper respiratory infection, or URI, is an infection of the nose, sinuses, or throat. URIs are spread by coughs, sneezes, and direct contact. The common cold is the most frequent kind of URI. The flu and sinus infections are other kinds of URIs. Almost all URIs are caused by viruses. Antibiotics won't cure them. But you can treat most infections with home care. This may include drinking lots of fluids and taking over-the-counter pain medicine. You will probably feel better in 4 to 10 days. The doctor has checked you carefully, but problems can develop later. If you notice any problems or new symptoms, get medical treatment right away. Follow-up care is a key part of your treatment and safety. Be sure to make and go to all appointments, and call your doctor if you are having problems. It's also a good idea to know your test results and keep a list of the medicines you take. How can you care for yourself at home? · To prevent dehydration, drink plenty of fluids, enough so that your urine is light yellow or clear like water. Choose water and other caffeine-free clear liquids until you feel better. If you have kidney, heart, or liver disease and have to limit fluids, talk with your doctor before you increase the amount of fluids you drink. · Take an over-the-counter pain medicine, such as acetaminophen (Tylenol), ibuprofen (Advil, Motrin), or naproxen (Aleve). Read and follow all instructions on the label. · Before you use cough and cold medicines, check the label. These medicines may not be safe for young children or for people with certain health problems. · Be careful when taking over-the-counter cold or flu medicines and Tylenol at the same time. Many of these medicines have acetaminophen, which is Tylenol. Read the labels to make sure that you are not taking more than the recommended dose. Too much acetaminophen (Tylenol) can be harmful. · Get plenty of rest.  · Do not smoke or allow others to smoke around you. If you need help quitting, talk to your doctor about stop-smoking programs and medicines. These can increase your chances of quitting for good. When should you call for help? Call 911 anytime you think you may need emergency care.  For example, call if:    · You have severe trouble breathing.    Call your doctor now or seek immediate medical care if:    · You seem to be getting much sicker.     · You have new or worse trouble breathing.     · You have a new or higher fever.     · You have a new rash.    Watch closely for changes in your health, and be sure to contact your doctor if:    · You have a new symptom, such as a sore throat, an earache, or sinus pain.     · You cough more deeply or more often, especially if you notice more mucus or a change in the color of your mucus.     · You do not get better as expected. Where can you learn more? Go to http://sudhakar-christen.info/. Enter E962 in the search box to learn more about \"Upper Respiratory Infection (Cold): Care Instructions. \"  Current as of: September 5, 2018  Content Version: 11.9  © 7357-9654 [employer]. Care instructions adapted under license by [employer] (which disclaims liability or warranty for this information). If you have questions about a medical condition or this instruction, always ask your healthcare professional. [address] any warranty or liability for your use of this information.

## 2020-01-09 ENCOUNTER — APPOINTMENT (OUTPATIENT)
Dept: GENERAL RADIOLOGY | Age: 65
End: 2020-01-09
Attending: EMERGENCY MEDICINE
Payer: MEDICAID

## 2020-01-09 ENCOUNTER — HOSPITAL ENCOUNTER (EMERGENCY)
Age: 65
Discharge: HOME | End: 2020-01-09
Attending: EMERGENCY MEDICINE
Payer: MEDICAID

## 2020-01-09 VITALS
HEART RATE: 91 BPM | HEIGHT: 67 IN | SYSTOLIC BLOOD PRESSURE: 125 MMHG | OXYGEN SATURATION: 95 % | WEIGHT: 277 LBS | RESPIRATION RATE: 18 BRPM | DIASTOLIC BLOOD PRESSURE: 84 MMHG | TEMPERATURE: 97.5 F | BODY MASS INDEX: 43.47 KG/M2

## 2020-01-09 DIAGNOSIS — R07.9 CHEST PAIN, UNSPECIFIED TYPE: Primary | ICD-10-CM

## 2020-01-09 DIAGNOSIS — F41.1 ANXIETY STATE: ICD-10-CM

## 2020-01-09 LAB
ANION GAP SERPL CALC-SCNC: 7 MMOL/L (ref 3–18)
ATRIAL RATE: 104 BPM
BASOPHILS # BLD: 0 K/UL (ref 0–0.1)
BASOPHILS NFR BLD: 0 % (ref 0–2)
BUN SERPL-MCNC: 15 MG/DL (ref 7–18)
BUN/CREAT SERPL: 19 (ref 12–20)
CALCIUM SERPL-MCNC: 9.2 MG/DL (ref 8.5–10.1)
CALCULATED R AXIS, ECG10: -42 DEGREES
CALCULATED T AXIS, ECG11: 86 DEGREES
CHLORIDE SERPL-SCNC: 101 MMOL/L (ref 100–111)
CO2 SERPL-SCNC: 27 MMOL/L (ref 21–32)
CREAT SERPL-MCNC: 0.8 MG/DL (ref 0.6–1.3)
DIAGNOSIS, 93000: NORMAL
DIFFERENTIAL METHOD BLD: ABNORMAL
EOSINOPHIL # BLD: 0 K/UL (ref 0–0.4)
EOSINOPHIL NFR BLD: 0 % (ref 0–5)
ERYTHROCYTE [DISTWIDTH] IN BLOOD BY AUTOMATED COUNT: 13.2 % (ref 11.6–14.5)
GLUCOSE SERPL-MCNC: 119 MG/DL (ref 74–99)
HCT VFR BLD AUTO: 48 % (ref 35–45)
HGB BLD-MCNC: 16 G/DL (ref 12–16)
LYMPHOCYTES # BLD: 1.8 K/UL (ref 0.9–3.6)
LYMPHOCYTES NFR BLD: 20 % (ref 21–52)
MCH RBC QN AUTO: 30 PG (ref 24–34)
MCHC RBC AUTO-ENTMCNC: 33.3 G/DL (ref 31–37)
MCV RBC AUTO: 90.1 FL (ref 74–97)
MONOCYTES # BLD: 0.4 K/UL (ref 0.05–1.2)
MONOCYTES NFR BLD: 4 % (ref 3–10)
NEUTS SEG # BLD: 6.8 K/UL (ref 1.8–8)
NEUTS SEG NFR BLD: 76 % (ref 40–73)
P-R INTERVAL, ECG05: 226 MS
PLATELET # BLD AUTO: 284 K/UL (ref 135–420)
PMV BLD AUTO: 10.7 FL (ref 9.2–11.8)
POTASSIUM SERPL-SCNC: 3.8 MMOL/L (ref 3.5–5.5)
Q-T INTERVAL, ECG07: 336 MS
QRS DURATION, ECG06: 116 MS
QTC CALCULATION (BEZET), ECG08: 441 MS
RBC # BLD AUTO: 5.33 M/UL (ref 4.2–5.3)
SODIUM SERPL-SCNC: 135 MMOL/L (ref 136–145)
TROPONIN I SERPL-MCNC: <0.02 NG/ML (ref 0–0.04)
VENTRICULAR RATE, ECG03: 104 BPM
WBC # BLD AUTO: 8.9 K/UL (ref 4.6–13.2)

## 2020-01-09 PROCEDURE — 84484 ASSAY OF TROPONIN QUANT: CPT

## 2020-01-09 PROCEDURE — 74011250637 HC RX REV CODE- 250/637: Performed by: EMERGENCY MEDICINE

## 2020-01-09 PROCEDURE — 93005 ELECTROCARDIOGRAM TRACING: CPT

## 2020-01-09 PROCEDURE — 85025 COMPLETE CBC W/AUTO DIFF WBC: CPT

## 2020-01-09 PROCEDURE — 99284 EMERGENCY DEPT VISIT MOD MDM: CPT

## 2020-01-09 PROCEDURE — 71046 X-RAY EXAM CHEST 2 VIEWS: CPT

## 2020-01-09 PROCEDURE — 80048 BASIC METABOLIC PNL TOTAL CA: CPT

## 2020-01-09 RX ORDER — ALPRAZOLAM 0.5 MG/1
2 TABLET ORAL
Status: COMPLETED | OUTPATIENT
Start: 2020-01-09 | End: 2020-01-09

## 2020-01-09 RX ADMIN — ALPRAZOLAM 2 MG: 0.5 TABLET ORAL at 13:51
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
## 2020-01-09 NOTE — DISCHARGE INSTRUCTIONS
Patient Education        Chest Pain: Care Instructions  Your Care Instructions    There are many things that can cause chest pain. Some are not serious and will get better on their own in a few days. But some kinds of chest pain need more testing and treatment. Your doctor may have recommended a follow-up visit in the next 8 to 12 hours. If you are not getting better, you may need more tests or treatment. Even though your doctor has released you, you still need to watch for any problems. The doctor carefully checked you, but sometimes problems can develop later. If you have new symptoms or if your symptoms do not get better, get medical care right away. If you have worse or different chest pain or pressure that lasts more than 5 minutes or you passed out (lost consciousness), call 911 or seek other emergency help right away. A medical visit is only one step in your treatment. Even if you feel better, you still need to do what your doctor recommends, such as going to all suggested follow-up appointments and taking medicines exactly as directed. This will help you recover and help prevent future problems. How can you care for yourself at home? · Rest until you feel better. · Take your medicine exactly as prescribed. Call your doctor if you think you are having a problem with your medicine. · Do not drive after taking a prescription pain medicine. When should you call for help? Call 911 if:    · You passed out (lost consciousness).     · You have severe difficulty breathing.     · You have symptoms of a heart attack. These may include:  ? Chest pain or pressure, or a strange feeling in your chest.  ? Sweating. ? Shortness of breath. ? Nausea or vomiting. ? Pain, pressure, or a strange feeling in your back, neck, jaw, or upper belly or in one or both shoulders or arms. ? Lightheadedness or sudden weakness. ? A fast or irregular heartbeat.   After you call 911, the  may tell you to chew 1 adult-strength or 2 to 4 low-dose aspirin. Wait for an ambulance. Do not try to drive yourself.    Call your doctor today if:    · You have any trouble breathing.     · Your chest pain gets worse.     · You are dizzy or lightheaded, or you feel like you may faint.     · You are not getting better as expected.     · You are having new or different chest pain. Where can you learn more? Go to http://sudhakar-christen.info/. Enter A120 in the search box to learn more about \"Chest Pain: Care Instructions. \"  Current as of: June 26, 2019  Content Version: 12.2  © 8914-4215 [email or web address]. Care instructions adapted under license by [employer] (which disclaims liability or warranty for this information). If you have questions about a medical condition or this instruction, always ask your healthcare professional. [place] [address] any warranty or liability for your use of this information. Patient Education        Anxiety Disorder: Care Instructions  Your Care Instructions    Anxiety is a normal reaction to stress. Difficult situations can cause you to have symptoms such as sweaty palms and a nervous feeling. In an anxiety disorder, the symptoms are far more severe. Constant worry, muscle tension, trouble sleeping, nausea and diarrhea, and other symptoms can make normal daily activities difficult or impossible. These symptoms may occur for no reason, and they can affect your work, school, or social life. Medicines, counseling, and self-care can all help. Follow-up care is a key part of your treatment and safety. Be sure to make and go to all appointments, and call your doctor if you are having problems. It's also a good idea to know your test results and keep a list of the medicines you take. How can you care for yourself at home? · Take medicines exactly as directed. Call your doctor if you think you are having a problem with your medicine.   · Go to your counseling sessions and follow-up appointments. · Recognize and accept your anxiety. Then, when you are in a situation that makes you anxious, say to yourself, \"This is not an emergency. I feel uncomfortable, but I am not in danger. I can keep going even if I feel anxious. \"  · Be kind to your body:  ? Relieve tension with exercise or a massage. ? Get enough rest.  ? Avoid alcohol, caffeine, nicotine, and illegal drugs. They can increase your anxiety level and cause sleep problems. ? Learn and do relaxation techniques. See below for more about these techniques. · Engage your mind. Get out and do something you enjoy. Go to a [employer] movie, or take a walk or hike. Plan your day. Having too much or too little to do can make you anxious. · Keep a record of your symptoms. Discuss your fears with a good friend or family member, or join a support group for people with similar problems. Talking to others sometimes relieves stress. · Get involved in social groups, or volunteer to help others. Being alone sometimes makes things seem worse than they are. · Get at least 30 minutes of exercise on most days of the week to relieve stress. Walking is a good choice. You also may want to do other activities, such as running, swimming, cycling, or playing tennis or team sports. Relaxation techniques  Do relaxation exercises 10 to 20 minutes a day. You can play soothing, relaxing music while you do them, if you wish. · Tell others in your house that you are going to do your relaxation exercises. Ask them not to disturb you. · Find a comfortable place, away from all distractions and noise. · Lie down on your back, or sit with your back straight. · Focus on your breathing. Make it slow and steady. · Breathe in through your nose. Breathe out through either your nose or mouth. · Breathe deeply, filling up the area between your navel and your rib cage. Breathe so that your belly goes up and down.   · Do not hold your breath. · Breathe like this for 5 to 10 minutes. Notice the feeling of calmness throughout your whole body. As you continue to breathe slowly and deeply, relax by doing the following for another 5 to 10 minutes:  · Tighten and relax each muscle group in your body. You can begin at your toes and work your way up to your head. · Imagine your muscle groups relaxing and becoming heavy. · Empty your mind of all thoughts. · Let yourself relax more and more deeply. · Become aware of the state of calmness that surrounds you. · When your relaxation time is over, you can bring yourself back to alertness by moving your fingers and toes and then your hands and feet and then stretching and moving your entire body. Sometimes people fall asleep during relaxation, but they usually wake up shortly afterward. · Always give yourself time to return to full alertness before you drive a car or do anything that might cause an accident if you are not fully alert. Never play a relaxation tape while you drive a car. When should you call for help? Call 911 anytime you think you may need emergency care. For example, call if:    · You feel you cannot stop from hurting yourself or someone else.   Robin Wilde the numbers for these national suicide hotlines: 3-040-994-TALK (7-239.801.7983) and 9-812-HVLXFIJ (6-825.850.6618). If you or someone you know talks about suicide or feeling hopeless, get help right away.   Watch closely for changes in your health, and be sure to contact your doctor if:    · You have anxiety or fear that affects your life.     · You have symptoms of anxiety that are new or different from those you had before. Where can you learn more? Go to http://sudhakar-christen.info/. Enter P754 in the search box to learn more about \"Anxiety Disorder: Care Instructions. \"  Current as of: May 28, 2019  Content Version: 12.2  © 0525-9462 [employer].  Care instructions adapted under license by [employer] (which disclaims liability or warranty for this information). If you have questions about a medical condition or this instruction, always ask your healthcare professional. [address] any warranty or liability for your use of this information.

## 2020-01-09 NOTE — ED TRIAGE NOTES
Patient arrived via triage for c/o chest pain that began 30 min before arrival. States \"I just feel really funny, my chest hurts, and my blood pressure is high\"

## 2020-02-13 ENCOUNTER — APPOINTMENT (OUTPATIENT)
Dept: GENERAL RADIOLOGY | Age: 65
End: 2020-02-13
Attending: NURSE PRACTITIONER
Payer: MEDICAID

## 2020-02-13 ENCOUNTER — HOSPITAL ENCOUNTER (EMERGENCY)
Age: 65
Discharge: HOME | End: 2020-02-13
Attending: EMERGENCY MEDICINE
Payer: MEDICAID

## 2020-02-13 VITALS
HEART RATE: 91 BPM | BODY MASS INDEX: 43.63 KG/M2 | RESPIRATION RATE: 20 BRPM | OXYGEN SATURATION: 97 % | SYSTOLIC BLOOD PRESSURE: 136 MMHG | WEIGHT: 278 LBS | DIASTOLIC BLOOD PRESSURE: 98 MMHG | HEIGHT: 67 IN | TEMPERATURE: 98.3 F

## 2020-02-13 DIAGNOSIS — H66.011 NON-RECURRENT ACUTE SUPPURATIVE OTITIS MEDIA OF RIGHT EAR WITH SPONTANEOUS RUPTURE OF TYMPANIC MEMBRANE: ICD-10-CM

## 2020-02-13 DIAGNOSIS — J45.21 MILD INTERMITTENT ASTHMA WITH ACUTE EXACERBATION: Primary | ICD-10-CM

## 2020-02-13 LAB
FLUAV AG NPH QL IA: NEGATIVE
FLUBV AG NOSE QL IA: NEGATIVE
GLUCOSE BLD STRIP.AUTO-MCNC: 99 MG/DL (ref 70–110)

## 2020-02-13 PROCEDURE — 71046 X-RAY EXAM CHEST 2 VIEWS: CPT

## 2020-02-13 PROCEDURE — 74011000250 HC RX REV CODE- 250: Performed by: NURSE PRACTITIONER

## 2020-02-13 PROCEDURE — 87804 INFLUENZA ASSAY W/OPTIC: CPT

## 2020-02-13 PROCEDURE — 74011636637 HC RX REV CODE- 636/637: Performed by: NURSE PRACTITIONER

## 2020-02-13 PROCEDURE — 99283 EMERGENCY DEPT VISIT LOW MDM: CPT

## 2020-02-13 PROCEDURE — 82962 GLUCOSE BLOOD TEST: CPT

## 2020-02-13 PROCEDURE — 94640 AIRWAY INHALATION TREATMENT: CPT

## 2020-02-13 RX ORDER — PREDNISONE 20 MG/1
60 TABLET ORAL
Status: COMPLETED | OUTPATIENT
Start: 2020-02-13 | End: 2020-02-13

## 2020-02-13 RX ORDER — PROMETHAZINE HYDROCHLORIDE AND DEXTROMETHORPHAN HYDROBROMIDE 6.25; 15 MG/5ML; MG/5ML
5 SYRUP ORAL
Qty: 118 ML | Refills: 0 | Status: SHIPPED | OUTPATIENT
Start: 2020-02-13 | End: 2020-02-20

## 2020-02-13 RX ORDER — PREDNISONE 50 MG/1
50 TABLET ORAL DAILY
Qty: 4 TAB | Refills: 0 | Status: SHIPPED | OUTPATIENT
Start: 2020-02-13 | End: 2020-02-17

## 2020-02-13 RX ORDER — AMOXICILLIN 500 MG/1
500 TABLET, FILM COATED ORAL 3 TIMES DAILY
Qty: 15 TAB | Refills: 0 | Status: SHIPPED | OUTPATIENT
Start: 2020-02-13 | End: 2020-02-18

## 2020-02-13 RX ORDER — IPRATROPIUM BROMIDE AND ALBUTEROL SULFATE 2.5; .5 MG/3ML; MG/3ML
3 SOLUTION RESPIRATORY (INHALATION)
Status: COMPLETED | OUTPATIENT
Start: 2020-02-13 | End: 2020-02-13

## 2020-02-13 RX ADMIN — PREDNISONE 60 MG: 20 TABLET ORAL at 11:54

## 2020-02-13 RX ADMIN — IPRATROPIUM BROMIDE AND ALBUTEROL SULFATE 3 ML: .5; 3 SOLUTION RESPIRATORY (INHALATION) at 11:54

## 2020-02-13 NOTE — DISCHARGE INSTRUCTIONS
Patient Education        Asthma in Adults: Care Instructions  Your Care Instructions    During an asthma attack, your airways swell and narrow as a reaction to certain things (triggers). This makes it hard to breathe. You may be able to prevent asthma attacks if you avoid the things that set off your asthma symptoms. Keeping your asthma under control and treating symptoms before they get bad can help you avoid severe attacks. If you can control your asthma, you may be able to do all of your normal daily activities. You may also avoid asthma attacks and trips to the hospital.  Follow-up care is a key part of your treatment and safety. Be sure to make and go to all appointments, and call your doctor if you are having problems. It's also a good idea to know your test results and keep a list of the medicines you take. How can you care for yourself at home? · Follow your asthma action plan so you can manage your symptoms at home. An asthma action plan will help you prevent and control airway reactions and will tell you what to do during an asthma attack. If you do not have an asthma action plan, work with your doctor to build one. · Take your asthma medicine exactly as prescribed. Medicine plays an important role in controlling asthma. Talk to your doctor right away if you have any questions about what to take and how to take it. ? Use your quick-relief medicine when you have symptoms of an attack. Quick-relief medicine often is an albuterol inhaler. Some people need to use quick-relief medicine before they exercise. ? Take your controller medicine every day, not just when you have symptoms. Controller medicine is usually an inhaled corticosteroid. The goal is to prevent problems before they occur. Do not use your controller medicine to try to treat an attack that has already started. It does not work fast enough to help.   ? If your doctor prescribed corticosteroid pills to use during an attack, take them as directed. They may take hours to work, but they may shorten the attack and help you breathe better. ? Keep your quick-relief medicine with you at all times. · Talk to your doctor before using other medicines. Some medicines, such as aspirin, can cause asthma attacks in some people. · Check yourself for asthma symptoms to know which step to follow in your action plan. Watch for things like being short of breath, having chest tightness, coughing, and wheezing. Also notice if symptoms wake you up at night or if you get tired quickly when you exercise. · If you have a peak flow meter, use it to check how well you are breathing. This can help you predict when an asthma attack is going to occur. Then you can take medicine to prevent the asthma attack or make it less severe. · See your doctor regularly. These visits will help you learn more about asthma and what you can do to control it. Your doctor will monitor your treatment to make sure the medicine is helping you. · Keep track of your asthma attacks and your treatment. After you have had an attack, write down what triggered it, what helped end it, and any concerns you have about your asthma action plan. Take your diary when you see your doctor. You can then review your asthma action plan and decide if it is working. · Do not smoke or allow others to smoke around you. Avoid smoky places. Smoking makes asthma worse. If you need help quitting, talk to your doctor about stop-smoking programs and medicines. These can increase your chances of quitting for good. · Learn what triggers an asthma attack for you, and avoid the triggers when you can. Common triggers include colds, smoke, air pollution, dust, pollen, mold, pets, cockroaches, stress, and cold air. · Avoid colds and the flu. Get a pneumococcal vaccine shot. If you have had one before, ask your doctor whether you need a second dose. Get a flu vaccine every fall.  If you must be around people with colds or the flu, wash your hands often. When should you call for help? Call 911 anytime you think you may need emergency care. For example, call if:    · You have severe trouble breathing.    Call your doctor now or seek immediate medical care if:    · Your symptoms do not get better after you have followed your asthma action plan.     · You cough up yellow, dark brown, or bloody mucus (sputum).    Watch closely for changes in your health, and be sure to contact your doctor if:    · Your coughing and wheezing get worse.     · You need to use quick-relief medicine on more than 2 days a week (unless it is just for exercise).     · You need help figuring out what is triggering your asthma attacks. Where can you learn more? Go to http://sudhakar-christen.info/. Enter P597 in the search box to learn more about \"Asthma in Adults: Care Instructions. \"  Current as of: June 9, 2019  Content Version: 12.2  © 6308-3945 [employer]. Care instructions adapted under license by [employer] (which disclaims liability or warranty for this information). If you have questions about a medical condition or this instruction, always ask your healthcare professional. [place] [address] any warranty or liability for your use of this information. Patient Education        Ear Infection (Otitis Media): Care Instructions  Your Care Instructions    An ear infection may start with a cold and affect the middle ear (otitis media). It can hurt a lot. Most ear infections clear up on their own in a couple of days. Most often you will not need antibiotics. This is because many ear infections are caused by a virus. Antibiotics don't work against a virus. Regular doses of pain medicines are the best way to reduce your fever and help you feel better. Follow-up care is a key part of your treatment and safety. Be sure to make and go to all appointments, and call your doctor if you are having problems. It's also a good idea to know your test results and keep a list of the medicines you take. How can you care for yourself at home? · Take pain medicines exactly as directed. ? If the doctor gave you a prescription medicine for pain, take it as prescribed. ? If you are not taking a prescription pain medicine, take an over-the-counter medicine, such as acetaminophen (Tylenol), ibuprofen (Advil, Motrin), or naproxen (Aleve). Read and follow all instructions on the label. ? Do not take two or more pain medicines at the same time unless the doctor told you to. Many pain medicines have acetaminophen, which is Tylenol. Too much acetaminophen (Tylenol) can be harmful. · Plan to take a full dose of pain reliever before bedtime. Getting enough sleep will help you get better. · Try a warm, moist washcloth on the ear. It may help relieve pain. · If your doctor prescribed antibiotics, take them as directed. Do not stop taking them just because you feel better. You need to take the full course of antibiotics. When should you call for help? Call your doctor now or seek immediate medical care if:    · You have new or increasing ear pain.     · You have new or increasing pus or blood draining from your ear.     · You have a fever with a stiff neck or a severe headache.    Watch closely for changes in your health, and be sure to contact your doctor if:    · You have new or worse symptoms.     · You are not getting better after taking an antibiotic for 2 days. Where can you learn more? Go to http://sudhakar-christen.info/. Enter N065 in the search box to learn more about \"Ear Infection (Otitis Media): Care Instructions. \"  Current as of: October 21, 2018  Content Version: 12.2  © 7586-1194 [employer]. Care instructions adapted under license by [employer] (which disclaims liability or warranty for this information).  If you have questions about a medical condition or this instruction, always ask your healthcare professional. [place] [address] any warranty or liability for your use of this information.
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
## 2020-02-13 NOTE — ED TRIAGE NOTES
Pt states I think have pneumonia, coughing for 2 days, lungs are burning, feel like they are full of fluid, wheezing.

## 2020-05-12 NOTE — ED TRIAGE NOTES
Patient got the flu and shingles shot 3 days ago Stage 5: Additional Anesthesia Type: 1% lidocaine with epinephrine

## 2020-07-06 ENCOUNTER — HOSPITAL ENCOUNTER (OUTPATIENT)
Dept: GENERAL RADIOLOGY | Age: 65
Discharge: HOME | End: 2020-07-06
Payer: MEDICARE

## 2020-07-06 DIAGNOSIS — M54.16 LUMBAR RADICULOPATHY: ICD-10-CM

## 2020-07-06 DIAGNOSIS — M54.6 PAIN IN THORACIC SPINE: ICD-10-CM

## 2020-07-06 DIAGNOSIS — M54.2 CERVICALGIA: ICD-10-CM

## 2020-07-06 PROCEDURE — 72114 X-RAY EXAM L-S SPINE BENDING: CPT

## 2020-07-06 PROCEDURE — 72052 X-RAY EXAM NECK SPINE 6/>VWS: CPT

## 2020-07-06 PROCEDURE — 72072 X-RAY EXAM THORAC SPINE 3VWS: CPT

## 2020-08-04 ENCOUNTER — HOSPITAL ENCOUNTER (OUTPATIENT)
Dept: PREADMISSION TESTING | Age: 65
Discharge: HOME | End: 2020-08-04
Payer: MEDICARE

## 2020-08-04 DIAGNOSIS — Z01.818 PREOPERATIVE TESTING: ICD-10-CM

## 2020-08-04 PROCEDURE — 87635 SARS-COV-2 COVID-19 AMP PRB: CPT

## 2020-08-05 LAB — SARS-COV-2, COV2NT: NOT DETECTED

## 2020-08-06 ENCOUNTER — ANESTHESIA EVENT (OUTPATIENT)
Dept: ENDOSCOPY | Age: 65
End: 2020-08-06
Payer: MEDICARE

## 2020-08-07 ENCOUNTER — HOSPITAL ENCOUNTER (OUTPATIENT)
Age: 65
Setting detail: OUTPATIENT SURGERY
Discharge: HOME | End: 2020-08-07
Attending: INTERNAL MEDICINE | Admitting: INTERNAL MEDICINE
Payer: MEDICARE

## 2020-08-07 ENCOUNTER — ANESTHESIA (OUTPATIENT)
Dept: ENDOSCOPY | Age: 65
End: 2020-08-07
Payer: MEDICARE

## 2020-08-07 VITALS
WEIGHT: 280.4 LBS | SYSTOLIC BLOOD PRESSURE: 159 MMHG | HEIGHT: 67 IN | DIASTOLIC BLOOD PRESSURE: 74 MMHG | TEMPERATURE: 98.1 F | BODY MASS INDEX: 44.01 KG/M2 | RESPIRATION RATE: 16 BRPM | OXYGEN SATURATION: 98 % | HEART RATE: 77 BPM

## 2020-08-07 LAB
BUN BLD-MCNC: 10 MG/DL (ref 7–18)
CHLORIDE BLD-SCNC: 100 MMOL/L (ref 100–108)
GLUCOSE BLD STRIP.AUTO-MCNC: 83 MG/DL (ref 70–110)
GLUCOSE BLD STRIP.AUTO-MCNC: 95 MG/DL (ref 74–106)
HCT VFR BLD CALC: 44 % (ref 36–49)
HGB BLD-MCNC: 15 G/DL (ref 12–16)
POTASSIUM BLD-SCNC: 4.1 MMOL/L (ref 3.5–5.5)
SODIUM BLD-SCNC: 140 MMOL/L (ref 136–145)

## 2020-08-07 PROCEDURE — 76060000031 HC ANESTHESIA FIRST 0.5 HR: Performed by: INTERNAL MEDICINE

## 2020-08-07 PROCEDURE — 74011250636 HC RX REV CODE- 250/636: Performed by: NURSE ANESTHETIST, CERTIFIED REGISTERED

## 2020-08-07 PROCEDURE — 76040000019: Performed by: INTERNAL MEDICINE

## 2020-08-07 PROCEDURE — 77030038604 HC SNR ENDO EXACTO USEN -B: Performed by: INTERNAL MEDICINE

## 2020-08-07 PROCEDURE — 82947 ASSAY GLUCOSE BLOOD QUANT: CPT

## 2020-08-07 PROCEDURE — 88302 TISSUE EXAM BY PATHOLOGIST: CPT

## 2020-08-07 PROCEDURE — 77030037186 HC VLV ENDOSC STRL DEFENDO DISP MVAT -A: Performed by: INTERNAL MEDICINE

## 2020-08-07 PROCEDURE — 88305 TISSUE EXAM BY PATHOLOGIST: CPT

## 2020-08-07 PROCEDURE — 82962 GLUCOSE BLOOD TEST: CPT

## 2020-08-07 RX ORDER — INSULIN LISPRO 100 [IU]/ML
INJECTION, SOLUTION INTRAVENOUS; SUBCUTANEOUS ONCE
Status: DISCONTINUED | OUTPATIENT
Start: 2020-08-07 | End: 2020-08-07 | Stop reason: HOSPADM

## 2020-08-07 RX ORDER — SODIUM CHLORIDE, SODIUM LACTATE, POTASSIUM CHLORIDE, CALCIUM CHLORIDE 600; 310; 30; 20 MG/100ML; MG/100ML; MG/100ML; MG/100ML
50 INJECTION, SOLUTION INTRAVENOUS CONTINUOUS
Status: DISCONTINUED | OUTPATIENT
Start: 2020-08-07 | End: 2020-08-07 | Stop reason: HOSPADM

## 2020-08-07 RX ORDER — PROPOFOL 10 MG/ML
INJECTION, EMULSION INTRAVENOUS AS NEEDED
Status: DISCONTINUED | OUTPATIENT
Start: 2020-08-07 | End: 2020-08-07 | Stop reason: HOSPADM

## 2020-08-07 RX ORDER — FAMOTIDINE 20 MG/1
20 TABLET, FILM COATED ORAL ONCE
Status: DISCONTINUED | OUTPATIENT
Start: 2020-08-07 | End: 2020-08-07 | Stop reason: HOSPADM

## 2020-08-07 RX ADMIN — PROPOFOL 20 MG: 10 INJECTION, EMULSION INTRAVENOUS at 09:45

## 2020-08-07 RX ADMIN — PROPOFOL 20 MG: 10 INJECTION, EMULSION INTRAVENOUS at 09:49

## 2020-08-07 RX ADMIN — PROPOFOL 50 MG: 10 INJECTION, EMULSION INTRAVENOUS at 09:41

## 2020-08-07 RX ADMIN — PROPOFOL 20 MG: 10 INJECTION, EMULSION INTRAVENOUS at 09:51

## 2020-08-07 RX ADMIN — PROPOFOL 20 MG: 10 INJECTION, EMULSION INTRAVENOUS at 09:47

## 2020-08-07 RX ADMIN — PROPOFOL 20 MG: 10 INJECTION, EMULSION INTRAVENOUS at 09:43

## 2020-08-07 RX ADMIN — SODIUM CHLORIDE, SODIUM LACTATE, POTASSIUM CHLORIDE, AND CALCIUM CHLORIDE: 600; 310; 30; 20 INJECTION, SOLUTION INTRAVENOUS at 09:35

## 2020-08-07 NOTE — DISCHARGE INSTRUCTIONS
Patient Education        Colonoscopy: What to Expect at [address] [place] [address]  After a colonoscopy, you'll stay at the clinic for 1 to 2 hours until the medicines wear off. Then you can go home. But you'll need to arrange for a ride. Your doctor will tell you when you can eat and do your other usual activities. Your doctor will talk to you about when you'll need your next colonoscopy. Your doctor can help you decide how often you need to be checked. This will depend on the results of your test and your risk for colorectal cancer. After the test, you may be bloated or have gas pains. You may need to pass gas. If a biopsy was done or a polyp was removed, you may have streaks of blood in your stool (feces) for a few days. Problems such as heavy rectal bleeding may not occur until several weeks after the test. This isn't common. But it can happen after polyps are removed. This care sheet gives you a general idea about how long it will take for you to recover. But each person recovers at a different pace. Follow the steps below to get better as quickly as possible. How can you care for yourself at home? Activity  · Rest when you feel tired. · You can do your normal activities when it feels okay to do so. Diet  · Follow your doctor's directions for eating. · Unless your doctor has told you not to, drink plenty of fluids. This helps to replace the fluids that were lost during the colon prep. · Do not drink alcohol. Medicines  · Your doctor will tell you if and when you can restart your medicines. He or she will also give you instructions about taking any new medicines. · If you take aspirin or some other blood thinner, ask your doctor if and when to start taking it again. Make sure that you understand exactly what your doctor wants you to do. · If polyps were removed or a biopsy was done during the test, your doctor may tell you not to take aspirin or other anti-inflammatory medicines for a few days.  These include ibuprofen (Advil, Motrin) and naproxen (Aleve). Other instructions  · For your safety, do not drive or operate machinery until the medicine wears off and you can think clearly. Your doctor may tell you not to drive or operate machinery until the day after your test.  · Do not sign legal documents or make major decisions until the medicine wears off and you can think clearly. The anesthesia can make it hard for you to fully understand what you are agreeing to. Follow-up care is a key part of your treatment and safety. Be sure to make and go to all appointments, and call your doctor if you are having problems. It's also a good idea to know your test results and keep a list of the medicines you take. When should you call for help? [id number] anytime you think you may need emergency care. For example, call if:  · You passed out (lost consciousness). · You pass maroon or bloody stools. · You have trouble breathing. Call your doctor now or seek immediate medical care if:  · You have pain that does not get better after you take pain medicine. · You are sick to your stomach or cannot drink fluids. · You have new or worse belly pain. · You have blood in your stools. · You have a fever. · You cannot pass stools or gas. Watch closely for changes in your health, and be sure to contact your doctor if you have any problems. Where can you learn more? Go to http://sudhakar-christen.info/  Enter E264 in the search box to learn more about \"Colonoscopy: What to Expect at Home. \"  Current as of: August 22, 2019               Content Version: 12.5  © 0085-7783 Healthwise, Incorporated. Care instructions adapted under license by [email or web address] (which disclaims liability or warranty for this information).  If you have questions about a medical condition or this instruction, always ask your healthcare professional. [address] any warranty or liability for your use of this information. DISCHARGE SUMMARY from Nurse    PATIENT INSTRUCTIONS:    After general anesthesia or intravenous sedation, for 24 hours or while taking prescription Narcotics:  · Limit your activities  · Do not drive and operate hazardous machinery  · Do not make important personal or business decisions  · Do  not drink alcoholic beverages  · If you have not urinated within 8 hours after discharge, please contact your surgeon on call. Report the following to your surgeon:  · Excessive pain, swelling, redness or odor of or around the surgical area  · Temperature over 100.5  · Nausea and vomiting lasting longer than 4 hours or if unable to take medications  · Any signs of decreased circulation or nerve impairment to extremity: change in color, persistent  numbness, tingling, coldness or increase pain  · Any questions      These are general instructions for a healthy lifestyle:    No smoking/ No tobacco products/ Avoid exposure to second hand smoke  Surgeon General's Warning:  Quitting smoking now greatly reduces serious risk to your health. Obesity, smoking, and sedentary lifestyle greatly increases your risk for illness    A healthy diet, regular physical exercise & weight monitoring are important for maintaining a healthy lifestyle    You may be retaining fluid if you have a history of heart failure or if you experience any of the following symptoms:  Weight gain of 3 pounds or more overnight or 5 pounds in a week, increased swelling in our hands or feet or shortness of breath while lying flat in bed. Please call your doctor as soon as you notice any of these symptoms; do not wait until your next office visit. The discharge information has been reviewed with the patient. The patient verbalized understanding.   Discharge medications reviewed with the patient and appropriate educational materials and side effects teaching were provided. ___________________________________________________________________________________________________________________________________\  Patient armband removed and shredded    Patient Education   Learning About Coronavirus [phone])  Coronavirus [phone]): Overview  What is coronavirus ([id number]-41)? The coronavirus disease (COVID-19) is caused by a virus. It is an illness that was first found in [place], [place], in December 2019. It has since spread worldwide. The virus can cause fever, cough, and trouble breathing. In severe cases, it can cause pneumonia and make it hard to breathe without help. It can cause death. Coronaviruses are a large group of viruses. They cause the common cold. They also cause more serious illnesses like Middle East respiratory syndrome (MERS) and severe acute respiratory syndrome (SARS). COVID-19 is caused by a novel coronavirus. That means it's a new type that has not been seen in people before. This virus spreads person-to-person through droplets from coughing and sneezing. It can also spread when you are close to someone who is infected. And it can spread when you touch something that has the virus on it, such as a doorknob or a tabletop. What can you do to protect yourself from coronavirus (COVID-19)? The best way to protect yourself from getting sick is to:  · Avoid areas where there is an outbreak. · Avoid contact with people who may be infected. · Wash your hands often with soap or alcohol-based hand sanitizers. · Avoid crowds and try to stay at least 6 feet away from other people. · Wash your hands often, especially after you cough or sneeze. Use soap and water, and scrub for at least 20 seconds. If soap and water aren't available, use an alcohol-based hand . · Avoid touching your mouth, nose, and eyes. What can you do to avoid spreading the virus to others?   To help avoid spreading the virus to others:  · Cover your mouth with a tissue when you cough or sneeze. Then throw the tissue in the trash. · Use a disinfectant to clean things that you touch often. · Stay home if you are sick or have been exposed to the virus. Don't go to school, work, or public areas. And don't use public transportation. · If you are sick:  ? Leave your home only if you need to get medical care. But call the doctor's office first so they know you're coming. And wear a face mask, if you have one.  ? If you have a face mask, wear it whenever you're around other people. It can help stop the spread of the virus when you cough or sneeze. ? Clean and disinfect your home every day. Use household  and disinfectant wipes or sprays. Take special care to clean things that you grab with your hands. These include doorknobs, remote controls, phones, and handles on your refrigerator and microwave. And don't forget countertops, tabletops, bathrooms, and computer keyboards. When to call for help  Call 911 anytime you think you may need emergency care. For example, call if:  · You have severe trouble breathing. (You can't talk at all.)  · You have constant chest pain or pressure. · You are severely dizzy or lightheaded. · You are confused or can't think clearly. · Your face and lips have a blue color. · You pass out (lose consciousness) or are very hard to wake up. Call your doctor now if you develop symptoms such as:  · Shortness of breath. · Fever. · Cough. If you need to get care, call ahead to the doctor's office for instructions before you go. Make sure you wear a face mask, if you have one, to prevent exposing other people to the virus. Where can you get the latest information? The following health organizations are tracking and studying this virus. Their websites contain the most up-to-date information. Brianna Caceres also learn what to do if you think you may have been exposed to the virus. · U.S. Centers for Disease Control and Prevention (CDC):  The CDC provides updated news about the disease and travel advice. The website also tells you how to prevent the spread of infection. www.cdc.gov  · World Health Organization [facility]): WHO offers information about the virus outbreaks. WHO also has travel advice. www.who.int  Current as of: April 1, 2020               Content Version: 12.4  © 3790-9449 Healthwise, Incorporated. Care instructions adapted under license by your healthcare professional. If you have questions about a medical condition or this instruction, always ask your healthcare professional. [address] any warranty or liability for your use of this information.

## 2020-08-07 NOTE — H&P
Gastroenterology Consult       Consult Date: 8/7/2020     Subjective:     Chief Complaint: history of tubulovillous adenoma    History of Present Illness: Waqas Simpson is a 72 y.o. female who is seen in colonoscopy. Patient was evaluated and examined in the office. Please see scanned note. No interval changes in medical status or examination.      Past Medical History:   Diagnosis Date    Abuse     Acute lower UTI (urinary tract infection)     Adverse effect of anesthesia 2005    Had Cardiac Arest    Anemia NEC     Anxiety disorder     Arrhythmia     Arthritis     Arthropathy     Asthma     Benign tumor of throat     Breast cancer ([address] 2004    right breast    Broken ankle 1960    Right- loss argenis bone    Burning with urination     Cancer (HCC)     Candidiasis of vagina     Cardiac arrhythmia     Cataract     Chronic pain     Colon cancer ([address]     CVA (cerebral vascular accident) ([address]     Deafness in left ear     Deficiency anemia     Depression     Diabetes mellitus     type 2    Essential hypertension     benign    Frequency of urination     H/O difficult intubation     Headache(784.0)     Hematuria     Hypertension     Kidney stone     Malignant neoplasm without specification of site ([address]     Melanoma ([address] 2011    Right leg; Dr [place] Microscopic hematuria     Morbid obesity ([address]     MRSA (methicillin resistant staph aureus) culture positive     Nausea     Orthostatic hypotension     Osteopenia     Ovarian cancer (HCC)     Pain management     Pneumaturia     Rectal bleeding     Recurrent UTI     Right flank pain     S/P inguinal hernia repair using synthetic patch     Sick sinus syndrome ([address]     Stroke ([address]     Urinary frequency     Urinary urgency     Urine leukocytes     Uterine cancer (HCC)     UTI (urinary tract infection)     lower     Past Surgical History:   Procedure Laterality Date    BIOPSY LOWER LEG  2011    upper right leg    BREAST SURGERY PROCEDURE UNLISTED      partial mysectomy    COLONOSCOPY N/A 2016    COLONOSCOPY w/bx w/ cautery w/ polypectomy  performed by Ladan Carpio MD at [address] [facility], COLON, DIAGNOSTIC      HX BREAST BIOPSY  , , ,     L breast    HX CATARACT REMOVAL  2018    HX CATARACT REMOVAL Bilateral 2018    HX CHOLECYSTECTOMY  1992    HX GASTRIC BYPASS      3 repairs    HX GYN  2009    abnormal pap    HX HERNIA REPAIR  3/08/2008    after intestional bypass (12)    HX HYSTERECTOMY      HX VERO AND BSO      Ovarian cancer , pt received radiation at [facility]    [place] MASTECTOMY, PARTIAL  2004    [place] REDUCTION OF [address]    50lbs removed    TOTAL ABDOM HYSTERECTOMY      ovarian      Family History   Problem Relation Age of Onset    Cancer Mother         breast    Hypertension Mother     Diabetes Mother     COPD Mother     Cancer Father         all    Stroke Father     Cancer Sister         melinoma    Bipolar Disorder Sister     Cancer Maternal Aunt         breast, bladder    Diabetes Maternal Grandmother     Cancer Maternal Grandfather     Cancer Paternal Grandmother         leukemia    Cancer Paternal Grandfather         melinoma     Social History     Tobacco Use    Smoking status: Former Smoker     Packs/day: 3.00     Years: 4.00     Pack years: 12.00     Types: Cigarettes     Last attempt to quit:      Years since quittin.6    Smokeless tobacco: Never Used   Substance Use Topics    Alcohol use: No      Allergies   Allergen Reactions    Other Food Hives     Peanut butter, strawberries    Other Medication Anaphylaxis     Anesthesia makes her throat collapsed twice- [facility] Red (Food Color) Hives, Swelling and Shortness of Breath     All red foods including red sauce -- pills must be white    Valium [Diazepam] Anaphylaxis     Throat collapses    Zithromax [Azithromycin] Anaphylaxis    Benzalkonium Chloride Other (comments)     Blister    Codeine Other (comments)    Doxycycline Swelling    Gentamicin Rash    Hydromorphone Other (comments)     Other reaction(s): other/intolerance  Extreme pain  Extreme pain  Extreme pain    Milk Containing Products Other (comments)     Except cheese    Morphine Hives     She cant take the blue pills    Neosporin [Neomycin-Bacitracin-Polymyxin] Rash    Nitrofurantoin Swelling    Omeprazole Other (comments)    Percocet [Oxycodone-Acetaminophen] Anaphylaxis    Phenazopyridine Hives    Seafood Angioedema    Sulfamethoxazole-Trimethoprim Hives and Itching    Sulfur Hives     BACTRIM    Tramadol Anaphylaxis     Current Facility-Administered Medications   Medication Dose Route Frequency    lactated Ringers infusion  50 mL/hr IntraVENous CONTINUOUS    famotidine (PEPCID) tablet 20 mg  20 mg Oral ONCE    insulin lispro (HUMALOG) injection   SubCUTAneous ONCE        Review of Systems:  A detailed 10 organ review of systems is obtained with pertinent positives as listed in the History of Present Illness and Past Medical History. All others are negative. Objective:     Physical Exam:  Visit Vitals  /88 (BP 1 Location: Left arm, BP Patient Position: Sitting)   Pulse 94   Temp 97.9 °F (36.6 °C)   Resp 20   Ht 5' 7\" (1.702 m)   Wt 127.2 kg (280 lb 6.4 oz)   SpO2 95%   Breastfeeding Yes   BMI 43.92 kg/m²      HEENT: Sclerae anicteric. Cardiovascular: Regular rate and rhythm. Respiratory:  Comfortable breathing with no accessory muscle use. GI:  Abdomen nondistended, soft, and nontender. Neurological:  Gross memory appears intact. Patient is alert and oriented.     Assessment/Plan:     Colonoscopy as planned

## 2020-08-07 NOTE — PERIOP NOTES
Patient arrived to Phase 2. Vitals signs monitoring initiated. Patient oriented to Person , Place, Time, Situation. Patient denies complaints of nausea and dizziness. Patient tolerated PO fluids. Patient ambulated from stretcher to chair with minimal assistance. IV removed. Patient dressed with home clothes. Reviewed discharge instructions. Point of contact deaf no given verbal instructions given via  Phone. Patient verbalized understanding. Patient transported to vehicle via wheel chair.

## 2020-08-07 NOTE — PERIOP NOTES
Pre-Op Summary    Pt arrived via car with family/friend and is oriented to time, place, person and situation. Patient with steady gait with cane assistive devices. Visit Vitals  /88 (BP 1 Location: Left arm, BP Patient Position: Sitting)   Pulse 94   Temp 97.9 °F (36.6 °C)   Resp 20   Ht 5' 7\" (1.702 m)   Wt 127.2 kg (280 lb 6.4 oz)   SpO2 95%   Breastfeeding Yes   BMI 43.92 kg/m²       Peripheral IV located on Left antecubital .    Patients belongings are locked up on [facility]. Patient's point of contact is her friend Ofe Tran. He has her cell phone with him, but according to patient he is deaf. Phone is on vibrate. He is waiting out front. They are able to receive medication information. They will be their ride home.

## 2020-08-07 NOTE — ANESTHESIA POSTPROCEDURE EVALUATION
Procedure(s):  COLONOSCOPY with cold snare polypectomy.     MAC    Anesthesia Post Evaluation      Multimodal analgesia: multimodal analgesia not used between 6 hours prior to anesthesia start to PACU discharge  Patient location during evaluation: bedside  Patient participation: complete - patient participated  Level of consciousness: awake  Pain score: 0  Pain management: adequate  Airway patency: patent  Anesthetic complications: no  Cardiovascular status: acceptable  Respiratory status: acceptable  Hydration status: acceptable  Post anesthesia nausea and vomiting:  none  Final Post Anesthesia Temperature Assessment:  Normothermia (36.0-37.5 degrees C)      INITIAL Post-op Vital signs:   Vitals Value Taken Time   /75 8/7/2020 10:01 AM   Temp 36.7 °C (98 °F) 8/7/2020 10:01 AM   Pulse 67 8/7/2020 10:01 AM   Resp 16 8/7/2020 10:01 AM   SpO2 99 % 8/7/2020 10:01 AM

## 2020-08-07 NOTE — PROCEDURES
Colonoscopy Report    Patient: Marden Hammans MRN: [id number]  SSN: [id number]    YOB: 1955  Age: 72 y.o. Sex: female      Date of Procedure: 8/7/2020    IMPRESSION:  1. Transverse colon lipoma  2. Scattered diverticulosis, sigmoid  3. Transverse colon polyp, 6 mm, flat, status post snare removed  4. Sigmoid colon polyp, 3 mm, flat, status post snare removed  5. Internal hemorrhoids    RECOMMENDATIONS:  1. Resume regular diet, Recommend high fiber. 2. Will contact with polyp results in 2 weeks. These results will determine timing to next screening. Patient will be instructed to contact our office if they have not received the results by three weeks. Indication:  Personal history of colon polyps (screening only), Screening colonoscopy  Procedure Performed: Colonoscopy polypectomy (cold snare)  Endoscopist: Rafal King MD  Assistant: Endoscopy Technician-1: Bhargavi Gore CNA  Endoscopy RN-1: Blair Bermeo RN  ASA: ASA 3 - Patient with moderate systemic disease with functional limitations  Mallampati Score: II (soft palate, uvula, fauces visible)  Anesthesia: Topical Hurricaine spray  Endoscope:     [x]  CF H 190AL   []  PCF H190AL   []  GIF H 190    Extent of Examination:cecum, which was identified by the ileocecal valve and appendiceal orifice  Quality of Preparation:     []  Excellent   [x]  Very Good   [] Fair but adequate   [] Fair, inadequate   []  Poor      Technique: The procedure was discussed with the patient including risks, benefits, alternatives including risks of IV sedation, bleeding, perforation and missed polyp. A safety timeout was performed. The patient was given incremental doses of intravenous sedation to achieve moderate sedation. The patients vital signs were monitored at all times including heart rate, rhythm, blood pressure and oxygen saturation. The patient was placed in left lateral position.    When adequate sedation was achieved a perianal inspection and a digital rectal exam were performed. Video colonoscope was introduced into the rectum and advanced under direct vision up to the cecum, which was identified by the ileocecal valve and appendiceal orifice. The cecum was identified by IC valve, appendiceal orifice and crows foot. With adequate insufflation and maneuvering of the withdrawing scope, the colonic mucosa was visualized carefully. Retroflexion was performed in the rectum and the distal rectum visualized. The patient tolerated the procedure very well and was transferred to recovery area. Findings:  1. Transverse colon lipoma  2. Scattered diverticulosis, sigmoid  3. Transverse colon polyp, 6 mm, flat, status post snare removed  4. Sigmoid colon polyp, 3 mm, flat, status post snare removed  5.  Internal hemorrhoids    EBL:Minimal  Specimen:   ID Type Source Tests Collected by Time Destination   1 : (cold snare) polyp  Preservative Colon, Transverse  Robinson Alcocer MD 8/7/2020 9681 Pathology   2 : (cold snare) polyp  Preservative Sigmoid  Robinson Alcocer MD 8/7/2020 8064 Pathology       Fidelia Mcmahon MD  August 7, 2020  10:02 AM

## 2020-08-07 NOTE — ANESTHESIA PREPROCEDURE EVALUATION
Relevant Problems   No relevant active problems       Anesthetic History     Increased risk of difficult airway     Comments: Apparently a difficult intubation 15 years ago but now has no upper teeth     Review of Systems / Medical History  Patient summary reviewed and pertinent labs reviewed    Pulmonary        Sleep apnea    Asthma : well controlled       Neuro/Psych       CVA: no residual symptoms  Psychiatric history     Cardiovascular    Hypertension: well controlled        Dysrhythmias : atrial fibrillation           GI/Hepatic/Renal                Endo/Other    Diabetes: well controlled, type 2    Morbid obesity, arthritis, cancer and anemia     Other Findings              Physical Exam    Airway  Mallampati: III  TM Distance: 4 - 6 cm  Neck ROM: normal range of motion   Mouth opening: Normal    Comments: Prior diff intubation 15 years ago when she had a full set of teeth but now has no upper teeth and just a few on the bottom  Cardiovascular    Rhythm: regular  Rate: normal         Dental    Dentition: Poor dentition and Edentulous  Comments: See note on airway   Pulmonary                Comments: Non labored Abdominal  GI exam deferred       Other Findings            Anesthetic Plan    ASA: 3  Anesthesia type: MAC            Anesthetic plan and risks discussed with: Patient

## 2021-04-19 PROBLEM — N89.8 LEUKORRHEA: Status: ACTIVE | Noted: 2021-04-19

## 2022-03-18 PROBLEM — N89.8 LEUKORRHEA: Status: ACTIVE | Noted: 2021-04-19

## 2022-03-20 PROBLEM — E66.01 OBESITY, MORBID (HCC): Status: ACTIVE | Noted: 2017-12-11

## 2024-09-03 NOTE — DISCHARGE INSTRUCTIONS
Breast Surgical Oncology  [place]    Date of Service: 09/03/2024    DIAGNOSIS:   67 y.o. female with a history of bilateral breast cancer. Left in 2012 and Right 2023    Date of Diagnosis: 8/24/23  Pathology: ILC, G1, ER 95%, NE 75%, HER2 1+, Ki67 10%  Clinical Stage: IA (cT1b cN0 Mx)  Pathologic Stage: IA (pT1b pN0 Mx)    TREATMENT:   Surgery: right skin sparing mastectomy with sentinel node biopsy on 12/12/23. Shannon Galvan M.D. Breast Surgical Oncology, TE reconstruction by Dr. James.   Systemic Therapy: Endocrine therapy deferred  Medical Oncologist: Lawrence Sykes M.D.     Radiation Treatment Summary: N/A  Radiation Oncologist: not indicated  Genetics: negative  Physical Therapy: prehabilitation     HISTORY OF PRESENT ILLNESS:   Josette Altamirano is here for oncological follow up.      Interval History:    She denies new breast concerns such as pain, masses, skin changes, or lumps under the arm.  She also denies bone pain, shortness of breath, abdominal pain and neurologic symptoms.     Pt has had blood in urine that has been monitored and evaluated    Pt has had vaginal bleeding- scheduled for D&C for further evaluation    Interval History:     Persistent symptoms/side effects of treatment:    Functional changes: ROM, neuropathy, weakness or fatigue- none    Psychosocial challenges- denies depression that lingers- intermittent    Lymphedema - none    Diet/Nutrition- stable wt    Sexual Dysfunction or challenges- having vaginal bleeding that is being worked up- having D&C        IMAGING:   No new    MEDICATIONS/ALLERGIES:     Current Outpatient Medications:     celecoxib (CELEBREX) 200 MG capsule, Take 1 capsule (200 mg total) by mouth 2 (two) times daily., Disp: 60 capsule, Rfl: 1    fluticasone propionate (FLONASE) 50 mcg/actuation nasal spray, 1 spray (50 mcg total) by Each Nostril route once daily., Disp: 11.1 mL, Rfl: 0    methylPREDNISolone (MEDROL DOSEPACK) 4 mg tablet, use as directed, Disp: 21 each,  Cellulitis: Care Instructions  Your Care Instructions    Cellulitis is a skin infection caused by bacteria, most often strep or staph. It often occurs after a break in the skin from a scrape, cut, bite, or puncture, or after a rash. Cellulitis may be treated without doing tests to find out what caused it. But your doctor may do tests, if needed, to look for a specific bacteria, like methicillin-resistant Staphylococcus aureus (MRSA). The doctor has checked you carefully, but problems can develop later. If you notice any problems or new symptoms, get medical treatment right away. Follow-up care is a key part of your treatment and safety. Be sure to make and go to all appointments, and call your doctor if you are having problems. It's also a good idea to know your test results and keep a list of the medicines you take. How can you care for yourself at home? · Take your antibiotics as directed. Do not stop taking them just because you feel better. You need to take the full course of antibiotics. · Prop up the infected area on pillows to reduce pain and swelling. Try to keep the area above the level of your heart as often as you can. · If your doctor told you how to care for your wound, follow your doctor's instructions. If you did not get instructions, follow this general advice:  ¨ Wash the wound with clean water 2 times a day. Don't use hydrogen peroxide or alcohol, which can slow healing. ¨ You may cover the wound with a thin layer of petroleum jelly, such as Vaseline, and a nonstick bandage. ¨ Apply more petroleum jelly and replace the bandage as needed. · Be safe with medicines. Take pain medicines exactly as directed. ¨ If the doctor gave you a prescription medicine for pain, take it as prescribed. ¨ If you are not taking a prescription pain medicine, ask your doctor if you can take an over-the-counter medicine.   To prevent cellulitis in the future  · Try to prevent cuts, scrapes, or other Rfl: 0    metoprolol succinate (TOPROL-XL) 50 MG 24 hr tablet, Take 1 tablet (50 mg total) by mouth once daily., Disp: 30 tablet, Rfl: 6    sacubitriL-valsartan (ENTRESTO)  mg per tablet, Take 1 tablet by mouth 2 (two) times daily., Disp: 180 tablet, Rfl: 1    traZODone (DESYREL) 50 MG tablet, Take 1 tablet (50 mg total) by mouth every evening., Disp: 30 tablet, Rfl: 11  Review of patient's allergies indicates:  No Known Allergies    PHYSICAL EXAM:   General: The patient appears well and is in no acute distress.       BREAST EXAM  No Asymmetry  S/p bilateral skin sparing mastectomy  Right:  - Mass: No  - Skin change: No  - Axillary LAD: No  Left: s/p lat flap reconstruction  - Mass: No  - Skin change: No  - Axillary LAD: No      ASSESSMENT:   Diagnoses and all orders for this visit:    Malignant neoplasm of overlapping sites of right breast in female, estrogen receptor positive    History of left breast cancer    Encounter for follow-up surveillance of breast cancer    Encounter for screening breast examination    Counseling and coordination of care    Counseling on health promotion and disease prevention       PLAN:   Josette has a benign exam today without evidence of local or distant relapse.    She will return in March for her 6 month follow up visit, or sooner should she develop breast concerns.      Pt will continue f/u with gyn regarding further eval of bleeding and with urology-    The patient is in agreement with the plan. Questions were encouraged and answered to patient's satisfaction. Josette will call our office with any questions or concerns.     I spent a total of 30 minutes on this visit. This includes face to face time and non-face to face time preparing to see the patient (eg, review of tests), obtaining and/or reviewing separately obtained history, documenting clinical information in the electronic or other health record, independently interpreting results and communicating results to the  injuries to your skin. Cellulitis most often occurs where there is a break in the skin. · If you get a scrape, cut, mild burn, or bite, wash the wound with clean water as soon as you can to help avoid infection. Don't use hydrogen peroxide or alcohol, which can slow healing. · If you have swelling in your legs (edema), support stockings and good skin care may help prevent leg sores and cellulitis. · Take care of your feet, especially if you have diabetes or other conditions that increase the risk of infection. Wear shoes and socks. Do not go barefoot. If you have athlete's foot or other skin problems on your feet, talk to your doctor about how to treat them. When should you call for help? Call your doctor now or seek immediate medical care if:    · You have signs that your infection is getting worse, such as:  ¨ Increased pain, swelling, warmth, or redness. ¨ Red streaks leading from the area. ¨ Pus draining from the area. ¨ A fever.     · You get a rash.    Watch closely for changes in your health, and be sure to contact your doctor if:    · You do not get better as expected. Where can you learn more? Go to http://sduhakar-christen.info/. Tonya Haque in the search box to learn more about \"Cellulitis: Care Instructions. \"  Current as of: April 18, 2018  Content Version: 11.8  © 4707-9490 [employer]. Care instructions adapted under license by [employer] (which disclaims liability or warranty for this information). If you have questions about a medical condition or this instruction, always ask your healthcare professional. [place] [address] any warranty or liability for your use of this information. [employer] Activation    Thank you for requesting access to [employer]. Please follow the instructions below to securely access and download your online medical record.  [employer] allows you to send messages to your doctor, view your test results, renew your patient/family/caregiver, or care coordinator.       Kathleen Campuzano, NP       prescriptions, schedule appointments, and more. How Do I Sign Up? 1. In your internet browser, go to [email or web address]  2. Click on the First Time User? Click Here link in the Sign In box. You will be redirect to the New Member Sign Up page. 3. Enter your [employer] Access Code exactly as it appears below. You will not need to use this code after youve completed the sign-up process. If you do not sign up before the expiration date, you must request a new code. MyChart Access Code: Activation code not generated  Current [employer] Status: Active (This is the date your [employer] access code will )    4. Enter the last four digits of your Social Security Number (xxxx) and Date of Birth (mm/dd/yyyy) as indicated and click Submit. You will be taken to the next sign-up page. 5. Create a [employer] ID. This will be your [employer] login ID and cannot be changed, so think of one that is secure and easy to remember. 6. Create a [employer] password. You can change your password at any time. 7. Enter your Password Reset Question and Answer. This can be used at a later time if you forget your password. 8. Enter your e-mail address. You will receive e-mail notification when new information is available in [address] 9. Click Sign Up. You can now view and download portions of your medical record. 10. Click the Download Summary menu link to download a portable copy of your medical information. Additional Information    If you have questions, please visit the Frequently Asked Questions section of the [employer] website at [email or web address] Remember, [employer] is NOT to be used for urgent needs. For medical emergencies, dial 911.

## (undated) DEVICE — MEDI-VAC NON-CONDUCTIVE SUCTION TUBING: Brand: CARDINAL HEALTH

## (undated) DEVICE — TUBING, SUCTION, 3/16" X 6', STRAIGHT: Brand: MEDLINE

## (undated) DEVICE — CONTAINER PREFIL FRMLN 15ML --

## (undated) DEVICE — FLEX ADVANTAGE 1500CC: Brand: FLEX ADVANTAGE

## (undated) DEVICE — KIT COLON W/ 1.1OZ LUB AND 2 END

## (undated) DEVICE — SYR 50ML SLIP TIP NSAF LF STRL --

## (undated) DEVICE — BASIN EMESIS 500CC ROSE 250/CS 60/PLT: Brand: MEDEGEN MEDICAL PRODUCTS, LLC

## (undated) DEVICE — KENDALL 500 SERIES DIAPHORETIC FOAM MONITORING ELECTRODE - TEAR DROP SHAPE ( 30/PK): Brand: KENDALL

## (undated) DEVICE — SNARE ENDO 2.4MMX230CM -- COLD EXACTO

## (undated) DEVICE — Device: Brand: DEFENDO VALVE AND CONNECTOR KIT